# Patient Record
Sex: FEMALE | Race: WHITE | NOT HISPANIC OR LATINO | Employment: FULL TIME | URBAN - METROPOLITAN AREA
[De-identification: names, ages, dates, MRNs, and addresses within clinical notes are randomized per-mention and may not be internally consistent; named-entity substitution may affect disease eponyms.]

---

## 2017-01-12 ENCOUNTER — OFFICE VISIT (OUTPATIENT)
Dept: SURGERY | Facility: CLINIC | Age: 42
End: 2017-01-12

## 2017-01-12 VITALS
OXYGEN SATURATION: 99 % | TEMPERATURE: 97.5 F | SYSTOLIC BLOOD PRESSURE: 124 MMHG | WEIGHT: 149.6 LBS | BODY MASS INDEX: 27.53 KG/M2 | HEART RATE: 61 BPM | HEIGHT: 62 IN | DIASTOLIC BLOOD PRESSURE: 82 MMHG

## 2017-01-12 DIAGNOSIS — Z15.02 GENETIC SUSCEPTIBILITY TO OVARIAN CANCER: ICD-10-CM

## 2017-01-12 DIAGNOSIS — D48.5 NEOPLASM OF UNCERTAIN BEHAVIOR OF SKIN: Primary | ICD-10-CM

## 2017-01-12 DIAGNOSIS — Z80.3 FH: BREAST CANCER: ICD-10-CM

## 2017-01-12 PROCEDURE — 99214 OFFICE O/P EST MOD 30 MIN: CPT | Performed by: SURGERY

## 2017-01-12 PROCEDURE — 88305 TISSUE EXAM BY PATHOLOGIST: CPT | Performed by: SURGERY

## 2017-01-12 PROCEDURE — 11100 PR BIOPSY OF SKIN LESION: CPT | Performed by: SURGERY

## 2017-01-12 RX ORDER — CELECOXIB 200 MG/1
200 CAPSULE ORAL DAILY
COMMUNITY
End: 2019-01-11

## 2017-01-12 RX ORDER — SERTRALINE HYDROCHLORIDE 100 MG/1
100 TABLET, FILM COATED ORAL DAILY
COMMUNITY
End: 2021-07-12

## 2017-01-12 RX ORDER — TAMOXIFEN CITRATE 20 MG/1
TABLET ORAL DAILY
COMMUNITY
End: 2019-01-11

## 2017-01-12 RX ORDER — FOLIC ACID 1 MG/1
1 TABLET ORAL DAILY
COMMUNITY

## 2017-01-12 NOTE — PROGRESS NOTES
Chief Complaint: Yanni Arauz is a 41 y.o. female who was seen in consultation at the request of Chris Fajardo,*  for a followup visit    History of Present Illness:  Patient presents with a newly diagnosed JAMES mutation.   Her mother had breast cancer diagnosed timothy ge 49, so Dr Fajardo sent a My Risk panel, which returend as positive for an JAMES mutation.      She noted no masses, skin changes, nipple discharge, nipple changes prior to her most recent imaging.   Her most recent imaging includes:  10/29/12        [facility]         Mammo Screening Bilateral            Yanni Arauz.  The breasts are heterogenously dense.  Ill-defined subcentimeter opacities are seen in the mid medial left breast on the CC view, and in the mid inferior right breast on the MLO view.  IMPRESSION:  Birads 0.    11/13/12        [facility]         Mammo Diagnostic Bilateral            aYnni Arauz.  The previously noted asymmetries int he medial left breast and inferior right breast do not persist on the additional views.   Birads 2.     10/30/13       [facility]         Mammo Screening Bilateral w/CAD           Yanni Arauz.  Birads 2.    11/20/14        [facility]         Digital Screening          Yanni Arauz.  Birads 2 Benign.  Breast Composition: Heterogeneously dense.      We then arranged for her to have a breast MRI, as follows:    08/11/15      [facility]         Breast Bilateral MRI          Yanni Arauz.  Birads 1 Negative.    She has not had  a breast biopsy in the past.  She has her uterus and ovaries, is premenopausal, and takes no hormones.  Her family history includes the following: Shehas one daughter, one son, one sister, 2 MA, 1 MU, 4 PA, 3 PU. Her mother had breast cancer diagnosed at age 49, alive in 2015.  No other breast and no ovarian cancer in her family.  She is here for evaluation.     Interval History:  In the interim,  Yanni Arauz  has done well.  She has noted no changes in her breast exam. No new masses, skin changes,  nipple changes, nipple discharge either breast.   She denies headache, bone pain, belly pain, cough, changes in vision or gait.  Her most recent imaging includes the following:  [facility] November 29, 2016 screening mammogram.  Heterogenous a dense.  BI-RADS 1 screening MRI, no  evidence of malignancy.    Review of Systems:  Review of Systems   Constitutional: Positive for unexpected weight change (4.6 lb weight increase).   All other systems reviewed and are negative.       Past Medical and Surgical History:  Breast Biopsy History:  Patient has not had a breast biopsy in the past.  Breast Cancer HIstory:  Patient does not have a past medical history of breast cancer.  Breast Operations, and year:  None   Obstetric/Gynecologic History:  Age menstrual periods began: 13  Patient is premenopausal, first day of last period: 11/8-13th  Number of pregnancies: 2  Number of live births: 2  Number of abortions or miscarriages: 0  Age of delivery of first child: 28  Patient did not breast feed.  Length of time taking birth control pills: since 17 on and off  Patient has never taken hormone replacement    Past Surgical History   Procedure Laterality Date   • Right oophorectomy       DUE TO ENDOMETRIOSIS   • Wrist fracture surgery     • Knee arthroplasty       Past Medical History   Diagnosis Date   • Anxiety    • Rheumatoid arthritis      JEUVENIRAJESH, AGE 6       Prior Hospitalizations, other than for surgery or childbirth, and year:  None     Social History     Social History   • Marital status:      Spouse name: N/A   • Number of children: N/A   • Years of education: N/A     Occupational History   • Not on file.     Social History Main Topics   • Smoking status: Never Smoker   • Smokeless tobacco: Not on file   • Alcohol use 1.8 oz/week     3 Glasses of wine per week   • Drug use: No   • Sexual activity: Not on file     Other Topics Concern   • Not on file     Social History Narrative     Patient is  ".  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    Family History:  Family History   Problem Relation Age of Onset   • Hypertension Mother    • Hyperlipidemia Mother    • Breast cancer Mother 49   • Hypertension Father    • Hyperlipidemia Father    • Heart failure Father    • Cancer Father 71     COLORECTAL       Vital Signs:  Visit Vitals   • /82 (BP Location: Left arm, Patient Position: Sitting, Cuff Size: Adult)   • Pulse 61   • Temp 97.5 °F (36.4 °C) (Temporal Artery )   • Ht 62\" (157.5 cm)   • Wt 149 lb 9.6 oz (67.9 kg)   • SpO2 99%   • BMI 27.36 kg/m2        Medications:    Current Outpatient Prescriptions:   •  celecoxib (CeleBREX) 200 MG capsule, Take 200 mg by mouth Daily., Disp: , Rfl:   •  folic acid (FOLVITE) 1 MG tablet, Take 1 mg by mouth Daily., Disp: , Rfl:   •  Golimumab (SIMPONI SC), Inject  under the skin., Disp: , Rfl:   •  methotrexate (RHEUMATREX) 2.5 MG tablet, Take 2.5 mg by mouth 3 (Three) Doses Each Week. Take Doses 12 (Twelve) Hours Apart., Disp: , Rfl:   •  sertraline (ZOLOFT) 100 MG tablet, Take 100 mg by mouth Daily., Disp: , Rfl:   •  tamoxifen (NOLVADEX) 20 MG chemo tablet, Take  by mouth Daily., Disp: , Rfl:      Allergies:  No Known Allergies    Physical Examination:  Visit Vitals   • /82 (BP Location: Left arm, Patient Position: Sitting, Cuff Size: Adult)   • Pulse 61   • Temp 97.5 °F (36.4 °C) (Temporal Artery )   • Ht 62\" (157.5 cm)   • Wt 149 lb 9.6 oz (67.9 kg)   • SpO2 99%   • BMI 27.36 kg/m2     General Appearance:  Patient is in no distress.  She is well kept and has a  average  build.   Psychiatric:  Patient with appropriate mood and affect. Alert and oriented to self, time, and place.    Breast, RIGHT:  small sized, symmetric with the contralateral side.  Breast skin is without erythema, edema, rashes.  There are no visible abnormalities upon " inspection during the arm-raising maneuver or with hands on hips in the sitting position. There is no nipple retraction, discharge or nipple/areolar skin changes.There are no masses palpable in the sitting or supine positions.    Breast, LEFT:   small sized, symmetric with the contralateral side.  Breast skin is without erythema, edema, rashes.  There is a skin lesion that is hyperpigmented and with some irregularity to the margins and color heterogeneity LEFT breast UOQ, inner ring. 5mm. It is very slightly raised.  There are no visible abnormalities upon inspection during the arm-raising maneuver or with hands on hips in the sitting position. There is no nipple retraction, discharge or nipple/areolar skin changes.There are no masses palpable in the sitting or supine positions.    Lymphatic:  There is no axillary, cervical, infraclavicular, or supraclavicular adenopathy bilaterally.  Eyes:  Pupils are round and reactive to light.  Cardiovascular:  Heart rate and rhythm are regular.  Respiratory:  Lungs are clear bilaterally with no crackles or wheezes in any lung field.  Gastrointestinal:  Abdomen is soft, nondistended, and nontender.  Musculoskeletal:  Good strength in all 4 extremities.  There is good range of motion in both shoulders.   Skin:  No new skin lesions or rashes on the skin excluding the breast (see breast exam above).        Imagin14        [facility]         Digital Screening          Juan Luis Arauz  Birads 2 Benign.  Breast Composition: Heterogeneously dense.    08/11/15      [facility]         Breast Bilateral MRI          Juan Luis Arauz  Birads 1 Negative.    16                        [facility]                            BILATERAL MRI BREAST                          JUAN LUIS ARAUZ  IMPRESSION:  NO MR signs of malignancy in either breast.  BI-RADS 1 NEGATIVE    16                                  [facility]                         SCREENING MG BILATERAL               JUAN LUIS ARAUZ  BIRADS 1  NEGATIVE  The breasts are heterogeneously dense.                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   Seeing her back in approximately 6 months.       7-28-16                         [facility]                           JUAN LUIS CROUCH MD     PLAN:  She is tolerating tamoxifen well with no adverse events or complications. I will plan on seeing her back in approximately 6 months.        Procedures:  Punch biopsy skin/areolar/nipple lesion:  Indication:  skin lesion of indeterminate significance  Location: LEFT breast UOQ,  inner ring  Consent:  The risks, benefits, and alternatives to the procedure were discussed with the patient, who understood and wished to proceed.  The risks described included, but were not limited to, bleeding and infection.  Description of Procedure:   After the patient was positioned supine on the procedure table, I prepped and draped the affected breast skin in sterile fashion.  I anesthetized the affected skin and subcutaneous tissue with 1% lidocaine with epinephrine.  I then I then took a 8mm punch biopsy of the affected skin.   Manual compression was held for 5 minutes, 3x 4-0 nylon sutures were placed to approximate the skin edges. Antibacterial ointment  and a sterile dressing were applied.  Tolerance: The patient tolerated the procedure well.  Disposition: We will see her back within a week to discuss her pathology.    Assessment:   Diagnosis Plan   1. Neoplasm of uncertain behavior of skin  MRI Breast Bilateral With & Without Contrast    Mammo Screening Bilateral With CAD   2. Genetic susceptibility to ovarian cancer  MRI Breast Bilateral With & Without Contrast     Mammo Screening Bilateral With CAD   3. FH: breast cancer  MRI Breast Bilateral With & Without Contrast    Mammo Screening Bilateral With CAD   1-  LEFT breast UOQ, inner ring, 5mm hyperpigmented lesion with some border irregularity and heterogeneity of color   - removed with punch 1-11-17    2-  JAMES: c.7271T>G- diagnosed via MyRisk from Dr Tana Erwin Tamoxifen since 1-2016    3-  mother age 49        Plan:  Yanni is doing well today.  We reviewed her interval imaging records.  Her examination is in good order other than a hyperpigmented lesion on the left breast skin and I recommended that we remove.  It appears about a millimeter larger and it has some color heterogeneity and border irregularity.  She agreed and tolerated this procedure well.      We previously discussed that the JAMES mutation that she carries harbors a lifetime risk of 17-52%, but likely closer to the 52% end ofthe spectrum due to the exact mutation.  We discussed that Iwould not necessarily recommend surgical risk reduction for this level of risk.  We did discuss proceeding with MRI in addition to mammography annualy for her surveillance with annual CBE.  She has seen Dr. Erwin and his been taking tamoxifen since January 2016.    I will arrange for her next mammogram and MRI from November 30, 2017 at [facility].    We discussed her weight gain is likely related to taking the tamoxifen.    I will call her with her pathology reports on her biopsy.    I asked her to continue her monthly SBE in addition to her annual CBE and exam, and to call us in the interim with concerns or changes.      Naida Mosqueda MD        We have spent 25 minutes in visit today, 15 in face to face .      Next Appointment:  Return in about 2 weeks (around 1/26/2017) for pt may call to cancel if has sutures removed at home.      EMR Dragon/transcription disclaimer:    Much of this encounter note is an electronic transcription/translocation of spoken language  to printed text.  The electronic translation of spoken language may permit erroneous, or at times, nonsensical words or phrases to be inadvertently transcribed.  Although I have reviewed the note from such areas, some may still exist.

## 2017-01-12 NOTE — MR AVS SNAPSHOT
Yanni Arauz   2017 11:30 AM   Office Visit    Dept Phone:  [phone]   Encounter #:  [id number]    Provider:  Naida Mosqueda MD   Department:  [facility] BREAST SURGERY                Your Full Care Plan              Your Updated Medication List          This list is accurate as of: 17 11:59 PM.  Always use your most recent med list.                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 BREAST CLINIC [facility]      AKT Signup     [facility] AKT allows you to send messages to your doctor, view your test results, renew your prescriptions, schedule appointments, and more. To sign up, go to [email or web address] and click on the Sign Up Now link in the New User? box. Enter your AKT Activation Code exactly as it appears below along with the last four digits of your Social Security Number and your Date of Birth () to complete the sign-up process. If you do not sign up before the expiration date, you must request a new code.    AKT Activation Code: [email or web address]  Expires: 2017  9:34 AM    If you have questions, you can email [email or web address] or  "call [phone] to talk to our MyChart staff. Remember, [employer] is NOT to be used for urgent needs. For medical emergencies, dial 911.               Other Info from Your Visit           Your Appointments     Jan 26, 2017  9:00 AM EST   Nurse Visit with NURSE/MA BREAST CLINIC [facility] BREAST SURGERY (--)    Ewa Turner [facility] [id number] [phone]              Allergies     No Known Allergies      Reason for Visit     Follow-up           Vital Signs     Blood Pressure Pulse Temperature Height    124/82 (BP Location: Left arm, Patient Position: Sitting, Cuff Size: Adult) 61 97.5 °F (36.4 °C) (Temporal Artery ) 62\" (157.5 cm)    Weight Oxygen Saturation Body Mass Index Smoking Status    149 lb 9.6 oz (67.9 kg) 99% 27.36 kg/m2 Never Smoker      Problems and Diagnoses Noted     FH: breast cancer    Genetic susceptibility to ovarian cancer    Skin tumor      Results         "

## 2017-01-13 LAB
CYTO UR: NORMAL
LAB AP CASE REPORT: NORMAL
Lab: NORMAL
PATH REPORT.FINAL DX SPEC: NORMAL
PATH REPORT.GROSS SPEC: NORMAL

## 2017-01-16 ENCOUNTER — TELEPHONE (OUTPATIENT)
Dept: SURGERY | Facility: CLINIC | Age: 42
End: 2017-01-16

## 2017-01-16 NOTE — TELEPHONE ENCOUNTER
Pathology from punch biopsy returned as nevus with mild cytologic atypia.    i called to let her know.      Final Diagnosis   LEFT BREAST BIOPSY (SKIN):  SKIN WITH COMPOUND NEVUS WITH MILD CYTOLOGIC ATYPIA.   MARGIN FREE.      [facility]/a/THM  [facility]/juan pablo

## 2017-01-26 ENCOUNTER — TELEPHONE (OUTPATIENT)
Dept: SURGERY | Facility: CLINIC | Age: 42
End: 2017-01-26

## 2017-01-26 ENCOUNTER — CLINICAL SUPPORT (OUTPATIENT)
Dept: SURGERY | Facility: CLINIC | Age: 42
End: 2017-01-26

## 2017-01-26 NOTE — TELEPHONE ENCOUNTER
Pt requested Dr. Mosqueda's recommendations for Dermatology  Beba Mack and Cosme Mejía    I shared with patient. LML

## 2017-01-26 NOTE — MR AVS SNAPSHOT
Yanni Giraldosley   2017 9:00 AM   Clinical Support    Dept Phone:  [phone]   Encounter #:  [id number]    Provider:  NURSE/MA BREAST CLINIC [facility]   Department:  [facility] BREAST SURGERY                Your Full Care Plan              Your Updated Medication List          This list is accurate as of: 17 11:11 AM.  Always use your most recent med list.                celecoxib 200 MG capsule   Commonly known as:  CeleBREX       folic acid 1 MG tablet   Commonly known as:  FOLVITE       methotrexate 2.5 MG tablet   Commonly known as:  RHEUMATREX       sertraline 100 MG tablet   Commonly known as:  ZOLOFT       SIMPONI SC       tamoxifen 20 MG chemo tablet   Commonly known as:  NOLVADEX               Instructions     None    Patient Instructions History      Upcoming Appointments     Visit Type Date Time Department    NURSE/MA VISIT 2017  9:00 AM [facility] BREAST CLINIC [facility]      [facility] [employer] Signup     [facility] [employer] allows you to send messages to your doctor, view your test results, renew your prescriptions, schedule appointments, and more. To sign up, go to [email or web address] and click on the Sign Up Now link in the New User? box. Enter your [employer] Activation Code exactly as it appears below along with the last four digits of your Social Security Number and your Date of Birth () to complete the sign-up process. If you do not sign up before the expiration date, you must request a new code.    [employer] Activation Code: [email or web address]  Expires: 2017  9:34 AM    If you have questions, you can email [email or web address] or call [phone] to talk to our [employer] staff. Remember, [employer] is NOT to be used for urgent needs. For medical emergencies, dial 911.               Other Info from Your Visit           Allergies     No Known Allergies      Vital Signs     Smoking Status                   Never Smoker

## 2017-01-26 NOTE — PROGRESS NOTES
Patient came in today for Left breast punch biopsy suture removal. Removed 3 sutures. Punch biopsy site healing, clean, dry & intact.     LML

## 2017-07-28 ENCOUNTER — TELEPHONE (OUTPATIENT)
Dept: SURGERY | Facility: CLINIC | Age: 42
End: 2017-07-28

## 2017-07-28 NOTE — TELEPHONE ENCOUNTER
Left msg to inform pt of appts:  [facility] 12-5-17 7:30 arrival Mammo/MRI  Dr. Mosqueda 12-14-17 12:45 arrival  Asked pt to call to confirm insurance info

## 2017-08-30 ENCOUNTER — TELEPHONE (OUTPATIENT)
Dept: SURGERY | Facility: CLINIC | Age: 42
End: 2017-08-30

## 2017-08-30 NOTE — TELEPHONE ENCOUNTER
Medical oncology note dated August 29, 2017 Dr. Everardo Pappas.  Agree with annual MRI and mammogram.  At this time she would like to not pursue additional prophylactic antiestrogen.  There is no information available at this time demonstrating the efficacy of concerns or aromatase inhibitors with this specific mutation.  With a long-term strategy of risk reduction, I suggested we consider tamoxifen again in the future.  Also, at the time of menopause 12 aromatase inhibitors would be appropriate.  Follow-up with me as needed.  We agree on a routine visit in 1-2 years.

## 2017-11-27 ENCOUNTER — TELEPHONE (OUTPATIENT)
Dept: SURGERY | Facility: CLINIC | Age: 42
End: 2017-11-27

## 2017-12-07 ENCOUNTER — CONVERSION ENCOUNTER (OUTPATIENT)
Dept: MAMMOGRAPHY | Facility: HOSPITAL | Age: 42
End: 2017-12-07

## 2017-12-11 ENCOUNTER — TELEPHONE (OUTPATIENT)
Dept: SURGERY | Facility: CLINIC | Age: 42
End: 2017-12-11

## 2017-12-11 DIAGNOSIS — D48.5 NEOPLASM OF UNCERTAIN BEHAVIOR OF SKIN: ICD-10-CM

## 2017-12-11 DIAGNOSIS — Z80.3 FH: BREAST CANCER: ICD-10-CM

## 2017-12-11 DIAGNOSIS — Z15.02 GENETIC SUSCEPTIBILITY TO OVARIAN CANCER: ICD-10-CM

## 2017-12-11 NOTE — TELEPHONE ENCOUNTER
[facility] December 7, 2017 bilateral screening mammogram with 3-D.  Heterogenous leg dense breast tissue.  BI-RADS 1.

## 2017-12-13 ENCOUNTER — TELEPHONE (OUTPATIENT)
Dept: SURGERY | Facility: CLINIC | Age: 42
End: 2017-12-13

## 2017-12-13 DIAGNOSIS — R92.8 ABNORMAL MRI, BREAST: Primary | ICD-10-CM

## 2017-12-13 NOTE — TELEPHONE ENCOUNTER
[facility] December 7, 2017 bilateral breast MRI:  There is a 5 mm focus of suspicious enhancement in the lower outer left breast approximate 4 cm from the nipple axillary lymph nodes are normal in appearance.  Physician directed ultrasound could be performed chromic recommend either ultrasound guided or MRI directed biopsy. We will ararnge for an US, US biopsy or MRI guided biopsy.

## 2017-12-13 NOTE — TELEPHONE ENCOUNTER
I called to let pt know that there is an imagnig finding for which either an US or MRI guided biopsy has been recommended and we will arrange and see her back.     This is based on 12/7/17 Breast MRI from [facility]  Radiologist is recommending biopsy-either via U/S or MRI guided.     I had to leave message for patient to call me back.     lml

## 2017-12-14 ENCOUNTER — TELEPHONE (OUTPATIENT)
Dept: SURGERY | Facility: CLINIC | Age: 42
End: 2017-12-14

## 2017-12-14 NOTE — TELEPHONE ENCOUNTER
Yanni called and confirmed her U/s appt for [facility] 12-21-17  I explained she will be having a Bx once they let us know which kind she needs.  She v/u  kdl

## 2017-12-14 NOTE — TELEPHONE ENCOUNTER
HERNAN for pt to call:    Melia called from [facility] she spoke with Dr. Diaz.  Dr. Maurice is going to do:    Left Breast U/S 12-21-17 arrive 10:00 @ [facility]  She will decided after the U/s if she is going to do  A U/s guided bx or MRI guided bx.    The tech from [facility] will call us and let us know.    susan

## 2017-12-22 ENCOUNTER — TELEPHONE (OUTPATIENT)
Dept: SURGERY | Facility: CLINIC | Age: 42
End: 2017-12-22

## 2017-12-22 DIAGNOSIS — R92.8 ABNORMAL MRI, BREAST: Primary | ICD-10-CM

## 2017-12-22 NOTE — TELEPHONE ENCOUNTER
Left breast ultrasound: In the 4 o'clock position of the left breast there is a focus of duct ectasia measuring 5 mm.  In the 3:00 location there is a 5.6 mm probable internal mammary lymph node.  Conclusion the duct ectasia has a benign appearance.  It is possible that the benign appearing intramammary lymph node could represent a suspicious focus of enhancement on the recent MRI.  So would recommend MRI guided biopsy: BI-RADS IVb.  We will arrange for an MRI guided biopsy at [facility] prior to seeing me back.

## 2017-12-26 ENCOUNTER — TELEPHONE (OUTPATIENT)
Dept: SURGERY | Facility: CLINIC | Age: 42
End: 2017-12-26

## 2017-12-26 NOTE — TELEPHONE ENCOUNTER
Scheduled MRi Breast Bx Left @ [facility]  1-2-18 arrive 12:00    Pt is aware of appointment-  Also patient is aware if results are not back, her appointment with Dr Mosqueda  Will be changed.    Pt v/u    elisabethl

## 2018-01-02 ENCOUNTER — CONVERSION ENCOUNTER (OUTPATIENT)
Dept: MRI IMAGING | Facility: HOSPITAL | Age: 43
End: 2018-01-02

## 2018-01-02 DIAGNOSIS — R92.8 ABNORMAL MRI, BREAST: ICD-10-CM

## 2018-01-05 ENCOUNTER — TELEPHONE (OUTPATIENT)
Dept: SURGERY | Facility: CLINIC | Age: 43
End: 2018-01-05

## 2018-01-05 NOTE — TELEPHONE ENCOUNTER
[facility] note dated January 2, 2018 MRI guided breast biopsy: 10-gauge Encore multiple core biopsies.  M-shaped marker placed.  Addendum will be provided when the pathology is available.

## 2018-01-08 ENCOUNTER — OFFICE VISIT (OUTPATIENT)
Dept: SURGERY | Facility: CLINIC | Age: 43
End: 2018-01-08

## 2018-01-08 VITALS
SYSTOLIC BLOOD PRESSURE: 156 MMHG | OXYGEN SATURATION: 99 % | HEART RATE: 76 BPM | WEIGHT: 151.2 LBS | HEIGHT: 62 IN | BODY MASS INDEX: 27.82 KG/M2 | DIASTOLIC BLOOD PRESSURE: 89 MMHG

## 2018-01-08 DIAGNOSIS — N60.12 FIBROCYSTIC DISEASE OF LEFT BREAST: ICD-10-CM

## 2018-01-08 DIAGNOSIS — Z15.02 GENETIC SUSCEPTIBILITY TO OVARIAN CANCER: ICD-10-CM

## 2018-01-08 DIAGNOSIS — Z80.3 FH: BREAST CANCER: ICD-10-CM

## 2018-01-08 DIAGNOSIS — D22.9 ATYPICAL NEVUS: Primary | ICD-10-CM

## 2018-01-08 DIAGNOSIS — R92.8 ABNORMAL MRI, BREAST: ICD-10-CM

## 2018-01-08 PROCEDURE — 99213 OFFICE O/P EST LOW 20 MIN: CPT | Performed by: SURGERY

## 2018-01-08 NOTE — PROGRESS NOTES
Chief Complaint: Yanni Arauz is a 42 y.o. female who was seen in consultation at the request of Chris Fajardo,*  for Neoplasm of uncertain behavior of skin  and a followup visit    History of Present Illness:  Patient presents with a newly diagnosed JAMES mutation.   Her mother had breast cancer diagnosed timothy ge 49, so Dr Fajardo sent a My Risk panel, which returend as positive for an JAMES mutation.      She noted no masses, skin changes, nipple discharge, nipple changes prior to her most recent imaging.   Her most recent imaging includes:  10/29/12        [facility]         Mammo Screening Bilateral            Yanni Arauz.  The breasts are heterogenously dense.  Ill-defined subcentimeter opacities are seen in the mid medial left breast on the CC view, and in the mid inferior right breast on the MLO view.  IMPRESSION:  Birads 0.    11/13/12        [facility]         Mammo Diagnostic Bilateral            Yanni Arauz.  The previously noted asymmetries int he medial left breast and inferior right breast do not persist on the additional views.   Birads 2.     10/30/13       [facility]         Mammo Screening Bilateral w/CAD           Yanni Arauz.  Birads 2.    11/20/14        [facility]         Digital Screening          Yanni Arauz.  Birads 2 Benign.  Breast Composition: Heterogeneously dense.      We then arranged for her to have a breast MRI, as follows:    08/11/15      [facility]         Breast Bilateral MRI          Yanni Arauz.  Birads 1 Negative.    She has not had  a breast biopsy in the past.  She has her uterus and ovaries, is premenopausal, and takes no hormones.  Her family history includes the following: Shehas one daughter, one son, one sister, 2 MA, 1 MU, 4 PA, 3 PU. Her mother had breast cancer diagnosed at age 49, alive in 2015.  No other breast and no ovarian cancer in her family.  She is here for evaluation.       In the interim,  Yanni Arauz  has done well.  She has noted no changes in her breast exam. No  new masses, skin changes, nipple changes, nipple discharge either breast.   She denies headache, bone pain, belly pain, cough, changes in vision or gait.  Her most recent imaging includes the following:  [facility] November 29, 2016 screening mammogram.  Heterogenous a dense.  BI-RADS 1 screening MRI, no  evidence of malignancy.      Interval History:  In the interim,  Yanni Arauz  has done well.  Her punch biopsy returned as a nevus with mild cytologic atypia.  She has seen Dr. Vaca her dermatology in follow-up about this.  Medical oncology note dated August 29, 2017 Dr. Everardo Pappas.  Agree with annual MRI and mammogram.  At this time she would like to not pursue additional prophylactic antiestrogen.  There is no information available at this time demonstrating the efficacy of concerns or aromatase inhibitors with this specific mutation.  With a long-term strategy of risk reduction, I suggested we consider tamoxifen again in the future.  Also, at the time of menopause 12 aromatase inhibitors would be appropriate.  Follow-up with me as needed.  We agree on a routine visit in 1-2 years.    She has noted no changes in her breast exam. No new masses, skin changes, nipple changes, nipple discharge either breast.   She denies headache, bone pain, belly pain, cough, changes in vision or gait.  Her most recent imaging includes the following:  [facility] December 7, 2017 bilateral screening mammogram with 3-D.  Heterogenous leg dense breast tissue.  BI-RADS 1.  [facility] December 7, 2017 bilateral breast MRI:  There is a 5 mm focus of suspicious enhancement in the lower outer left breast approximate 4 cm from the nipple axillary lymph nodes are normal in appearance.  Physician directed ultrasound could be performed chromic recommend either ultrasound guided or MRI directed biopsy.    Left breast ultrasound: In the 4 o'clock position of the left breast there is a focus of duct ectasia  measuring 5 mm.  In the 3:00 location there is a 5.6 mm probable internal mammary lymph node.  Conclusion the duct ectasia has a benign appearance.  It is possible that the benign appearing intramammary lymph node could represent a suspicious focus of enhancement on the recent MRI.  So would recommend MRI guided biopsy: BI-RADS IVb.  [facility] note dated January 2, 2018 MRI guided breast biopsy: 10-gauge Encore multiple core biopsies.  M-shaped marker placed.  Pathology returned as benign breast tissue with columnar cell change, apically metaplasia, and mild stromal fibrosis.  She tells me that she had some bruising and some drainage of the hematoma from her mammotomy.  Denies any erythema or warmth.      Review of Systems:  Review of Systems   Constitutional: Positive for unexpected weight change (2 lb wt gain).   All other systems reviewed and are negative.       Past Medical and Surgical History:  Breast Biopsy History:  Patient has not had a breast biopsy in the past.  Breast Cancer HIstory:  Patient does not have a past medical history of breast cancer.  Breast Operations, and year:  None   Obstetric/Gynecologic History:  Age menstrual periods began: 13  Patient is premenopausal, first day of last period: 11/8-13th  Number of pregnancies: 2  Number of live births: 2  Number of abortions or miscarriages: 0  Age of delivery of first child: 28  Patient did not breast feed.  Length of time taking birth control pills: since 17 on and off  Patient has never taken hormone replacement    Past Surgical History:   Procedure Laterality Date   • KNEE ARTHROPLASTY     • RIGHT OOPHORECTOMY      DUE TO ENDOMETRIOSIS   • WRIST FRACTURE SURGERY       Past Medical History:   Diagnosis Date   • Anxiety    • Rheumatoid arthritis     TAMIKO, AGE 6       Prior Hospitalizations, other than for surgery or childbirth, and year:  None     Social History     Social History   • Marital status:      Spouse name: N/A  "  • Number of children: N/A   • Years of education: N/A     Occupational History   • Not on file.     Social History Main Topics   • Smoking status: Never Smoker   • Smokeless tobacco: Not on file   • Alcohol use 1.8 oz/week     3 Glasses of wine per week   • Drug use: No   • Sexual activity: Not on file     Other Topics Concern   • Not on file     Social History Narrative     Patient is .  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    Family History:  Family History   Problem Relation Age of Onset   • Hypertension Mother    • Hyperlipidemia Mother    • Breast cancer Mother 49   • Hypertension Father    • Hyperlipidemia Father    • Heart failure Father    • Cancer Father 71     COLORECTAL       Vital Signs:  /89  Pulse 76  Ht 157.5 cm (62\")  Wt 68.6 kg (151 lb 3.2 oz)  SpO2 99%  BMI 27.65 kg/m2     Medications:    Current Outpatient Prescriptions:   •  celecoxib (CeleBREX) 200 MG capsule, Take 200 mg by mouth Daily., Disp: , Rfl:   •  folic acid (FOLVITE) 1 MG tablet, Take 1 mg by mouth Daily., Disp: , Rfl:   •  Golimumab (SIMPONI SC), Inject  under the skin., Disp: , Rfl:   •  methotrexate (RHEUMATREX) 2.5 MG tablet, Take 2.5 mg by mouth 3 (Three) Doses Each Week. Take Doses 12 (Twelve) Hours Apart., Disp: , Rfl:   •  sertraline (ZOLOFT) 100 MG tablet, Take 100 mg by mouth Daily., Disp: , Rfl:   •  tamoxifen (NOLVADEX) 20 MG chemo tablet, Take  by mouth Daily., Disp: , Rfl:      Allergies:  No Known Allergies    Physical Examination:  /89  Pulse 76  Ht 157.5 cm (62\")  Wt 68.6 kg (151 lb 3.2 oz)  SpO2 99%  BMI 27.65 kg/m2  General Appearance:  Patient is in no distress.  She is well kept and has a  average  build.   Psychiatric:  Patient with appropriate mood and affect. Alert and oriented to self, time, and place.    Breast, RIGHT:  small sized, symmetric with the contralateral " side.  Breast skin is without erythema, edema, rashes.  There are no visible abnormalities upon inspection during the arm-raising maneuver or with hands on hips in the sitting position. There is no nipple retraction, discharge or nipple/areolar skin changes.There are no masses palpable in the sitting or supine positions.    Breast, LEFT:   small sized, symmetric with the contralateral side.  Breast skin is without erythema, edema, rashes.  There is a hypertrophic transverse incision from where we did a punch biopsy left breast upper outer quadrant revealed atypical nevus There are no visible abnormalities upon inspection during the arm-raising maneuver or with hands on hips in the sitting position. There is no nipple retraction, discharge or nipple/areolar skin changes.There are no masses palpable in the sitting or supine positions.  Left breast lower outer quadrant mammotomy with underlying hematoma.  Skin has  and healing by secondary intention.    Lymphatic:  There is no axillary, cervical, infraclavicular, or supraclavicular adenopathy bilaterally.  Eyes:  Pupils are round and reactive to light.  Cardiovascular:  Heart rate and rhythm are regular.  Respiratory:  Lungs are clear bilaterally with no crackles or wheezes in any lung field.  Gastrointestinal:  Abdomen is soft, nondistended, and nontender.  Musculoskeletal:  Good strength in all 4 extremities.  There is good range of motion in both shoulders.   Skin:  No new skin lesions or rashes on the skin excluding the breast (see breast exam above).        Imagin14        [facility]         Digital Screening          Juan Luis Arauz  Birads 2 Benign.  Breast Composition: Heterogeneously dense.    08/11/15      [facility]         Breast Bilateral MRI          Juan Luis Arauz  Birads 1 Negative.    16                        [facility]                            BILATERAL MRI BREAST                          JUAN LUIS ARAUZ  IMPRESSION:  NO MR signs of malignancy  in either breast.  BI-RADS 1 NEGATIVE    11-29-16                                  [facility]                         SCREENING MG BILATERAL               JUAN LUIS HANEY  BIRADS 1 NEGATIVE  The breasts are heterogeneously dense.    [facility] December 7, 2017 bilateral screening mammogram with 3-D.  Heterogenous leg dense breast tissue.  BI-RADS 1.    [facility] December 7, 2017 bilateral breast MRI:  There is a 5 mm focus of suspicious enhancement in the lower outer left breast approximate 4 cm from the nipple axillary lymph nodes are normal in appearance.  Physician directed ultrasound could be performed chromic recommend either ultrasound guided or MRI directed biopsy. We will ararnge for an US, US biopsy or MRI guided biopsy.    Left breast ultrasound: In the 4 o'clock position of the left breast there is a focus of duct ectasia measuring 5 mm.  In the 3:00 location there is a 5.6 mm probable internal mammary lymph node.  Conclusion the duct ectasia has a benign appearance.  It is possible that the benign appearing intramammary lymph node could represent a suspicious focus of enhancement on the recent MRI.  So would recommend MRI guided biopsy: BI-RADS IVb.  We will arrange for an MRI guided biopsy at [facility] prior to seeing me back    Pathology:  Pathology from punch biopsy returned as nevus with mild cytologic atypia.  i called to let her know.   1-16-17  [facility]  PATHOLOGY  JUAN LUIS HANEY  Final Diagnosis   LEFT BREAST BIOPSY (SKIN):  SKIN WITH COMPOUND NEVUS WITH MILD CYTOLOGIC ATYPIA.   MARGIN FREE.       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   Seeing her back in approximately 6 months.        7-28-16                         [facility] MED ONC                           JUAN LUIS CROUCH MD     PLAN:  She is tolerating tamoxifen well with no adverse events or complications. I will plan on seeing her back in approximately 6 months.    Medical oncology note dated August 29, 2017 Dr. Everardo Pappas.  Agree with annual MRI and mammogram.  At this time she would like to not pursue additional prophylactic antiestrogen.  There is no information available at this time demonstrating the efficacy of concerns or aromatase inhibitors with this specific mutation.  With a long-term strategy of risk reduction, I suggested we consider tamoxifen again in the future.  Also, at the time of menopause 12 aromatase inhibitors would be appropriate.  Follow-up with me as needed.  We agree on a routine visit in 1-2 years.    [facility] note dated January 2, 2018 MRI guided breast biopsy: 10-gauge Encore multiple core biopsies.  M-shaped marker placed.  Addendum will be provided when the pathology is available.    Procedures:  Punch biopsy skin lesion 1-12-17:  Indication:  skin lesion of indeterminate significance  Location: LEFT breast UOQ,  inner ring  Consent:  The risks, benefits, and alternatives to the procedure were discussed with the patient, who understood and wished to proceed.  The risks described included, but were not limited to, bleeding and infection.  Description of Procedure:   After the patient was positioned supine on the procedure table, I prepped and draped the affected breast skin in sterile fashion.  I anesthetized the affected skin and subcutaneous tissue with 1% lidocaine with epinephrine.  I then I then took a 8mm punch biopsy of the affected skin.   Manual compression was held for 5 minutes, 3x 4-0 nylon sutures were placed to approximate the skin edges. Antibacterial ointment  and a sterile dressing were applied.  Tolerance: The patient tolerated the procedure  well.  Disposition: We will see her back within a week to discuss her pathology.    Assessment:   Diagnosis Plan   1. Atypical nevus  MRI Breast Bilateral With & Without Contrast    Mammo Screening Digital Tomosynthesis Bilateral With CAD   2. Genetic susceptibility to ovarian cancer  MRI Breast Bilateral With & Without Contrast    Mammo Screening Digital Tomosynthesis Bilateral With CAD   3. FH: breast cancer  MRI Breast Bilateral With & Without Contrast    Mammo Screening Digital Tomosynthesis Bilateral With CAD   4. Abnormal MRI, breast  MRI Breast Bilateral With & Without Contrast    Mammo Screening Digital Tomosynthesis Bilateral With CAD   5. Fibrocystic disease of left breast  MRI Breast Bilateral With & Without Contrast    Mammo Screening Digital Tomosynthesis Bilateral With CAD      1-  LEFT breast UOQ, inner ring, 5mm hyperpigmented lesion with some border irregularity and heterogeneity of color   - removed with punch 1-11-17    2-  JAMES: c.[id number]- diagnosed via MyRisk from Dr Tana Erwin Tamoxifen since 1-2016- saw Dr Pappas- stopped this 2017.    3-  mother age 49    4-5  MRI in December 2017 [facility] lower outer quadrant 5 mm area of enhancement, no ultrasound correlate.  MRI guided biopsy showed benign breast with columnar cell change apically and metaplasia and stromal fibrosis.      Plan:  Yanni is doing well today.  We reviewed her interval imaging records as well as pathology report and examination today.  Her examination is in good order other than the hematoma from the MRI guided biopsy left breast.      We previously discussed that the JAMES mutation that she carries harbors a lifetime risk of 17-52%, but likely closer to the 52% end ofthe spectrum due to the exact mutation.  We discussed that Iwould not necessarily recommend surgical risk reduction for this level of risk.    We did discuss proceeding with MRI in addition to mammography annualy for her surveillance with annual  CBE.  She has seen Dr. Erwin as well as Dr. Pappas.  Dr. Erwin initially started her on tamoxifen in January 2016, and she stopped this after seeing in talking with Dr. Pappas.  She has a follow-up with Dr. Pappas in 1-2 years per his note.    We reviewed her imaging and pathology report.  We discussed the sensitivity of MRI and the benign nature of her biopsy report. We will obtain a concordance report from [facility].    I will arrange for a bilateral screening mammogram and MRI for December 8, 2018 at [facility] and to see us back after.  She does have surveillance with her dermatologist for the atypical nevus for annual skin screening.  I did ask her to continue her self breast exam and to call us in the interim with any concerns or changes and we'd be happy to see her back sooner.           Naida Mosqueda MD    Addendum: Benign breast tissue with columnar cell change apically metaplasia and mild stromal fibrosis is concordant.  We will let her know.    We have spent 15 minutes in visit today, 10 in face to face .      Next Appointment:  Return for Next scheduled follow up, after imaging.      EMR Dragon/transcription disclaimer:    Much of this encounter note is an electronic transcription/translocation of spoken language to printed text.  The electronic translation of spoken language may permit erroneous, or at times, nonsensical words or phrases to be inadvertently transcribed.  Although I have reviewed the note from such areas, some may still exist.

## 2018-01-09 ENCOUNTER — TELEPHONE (OUTPATIENT)
Dept: SURGERY | Facility: CLINIC | Age: 43
End: 2018-01-09

## 2018-01-09 NOTE — TELEPHONE ENCOUNTER
I called to let her know radiologist feels pathology is concordant and benign. Patient thanked us. job

## 2018-07-06 ENCOUNTER — TELEPHONE (OUTPATIENT)
Dept: SURGERY | Facility: CLINIC | Age: 43
End: 2018-07-06

## 2018-07-06 NOTE — TELEPHONE ENCOUNTER
PT CALLED. HAD BX ON LFT BR 12/17 AND EVERYTHING WAS FINE BUT IS STILL HAVE A LOT OF PAIN IN LFT BR.    SCHEDULED 7.31.18 ARRIVE 8:45AM    RR

## 2018-07-31 ENCOUNTER — OFFICE VISIT (OUTPATIENT)
Dept: SURGERY | Facility: CLINIC | Age: 43
End: 2018-07-31

## 2018-07-31 VITALS
HEIGHT: 62 IN | SYSTOLIC BLOOD PRESSURE: 133 MMHG | BODY MASS INDEX: 28.34 KG/M2 | OXYGEN SATURATION: 99 % | WEIGHT: 154 LBS | HEART RATE: 61 BPM | DIASTOLIC BLOOD PRESSURE: 84 MMHG

## 2018-07-31 DIAGNOSIS — N64.4 BREAST PAIN: ICD-10-CM

## 2018-07-31 DIAGNOSIS — D22.9 ATYPICAL NEVUS: Primary | ICD-10-CM

## 2018-07-31 DIAGNOSIS — Z80.3 FH: BREAST CANCER: ICD-10-CM

## 2018-07-31 DIAGNOSIS — R92.8 ABNORMAL MRI, BREAST: ICD-10-CM

## 2018-07-31 DIAGNOSIS — N60.12 FIBROCYSTIC DISEASE OF LEFT BREAST: ICD-10-CM

## 2018-07-31 DIAGNOSIS — Z15.02 GENETIC SUSCEPTIBILITY TO OVARIAN CANCER: ICD-10-CM

## 2018-07-31 PROCEDURE — 99213 OFFICE O/P EST LOW 20 MIN: CPT | Performed by: SURGERY

## 2018-07-31 NOTE — PROGRESS NOTES
Chief Complaint: Yanni Arauz is a 43 y.o. female who was seen in consultation at the request of No ref. provider found  for Neoplasm of uncertain behavior of skin  and a followup visit    History of Present Illness:  Patient presents with a newly diagnosed JAMES mutation.   Her mother had breast cancer diagnosed timothy ge 49, so Dr Fajardo sent a My Risk panel, which returend as positive for an JAMES mutation.      She noted no masses, skin changes, nipple discharge, nipple changes prior to her most recent imaging.   Her most recent imaging includes:  10/29/12        [facility]         Mammo Screening Bilateral            Yanni Arauz.  The breasts are heterogenously dense.  Ill-defined subcentimeter opacities are seen in the mid medial left breast on the CC view, and in the mid inferior right breast on the MLO view.  IMPRESSION:  Birads 0.    11/13/12        [facility]         Mammo Diagnostic Bilateral            Yanni Arauz.  The previously noted asymmetries int he medial left breast and inferior right breast do not persist on the additional views.   Birads 2.     10/30/13       [facility]         Mammo Screening Bilateral w/CAD           Yanni Arauz.  Birads 2.    11/20/14        [facility]         Digital Screening          Yanni Arauz.  Birads 2 Benign.  Breast Composition: Heterogeneously dense.      We then arranged for her to have a breast MRI, as follows:    08/11/15      [facility]         Breast Bilateral MRI          Yanni Arauz.  Birads 1 Negative.    She has not had  a breast biopsy in the past.  She has her uterus and ovaries, is premenopausal, and takes no hormones.  Her family history includes the following: Shehas one daughter, one son, one sister, 2 MA, 1 MU, 4 PA, 3 PU. Her mother had breast cancer diagnosed at age 49, alive in 2015.  No other breast and no ovarian cancer in her family.  She is here for evaluation.       In the interim,  Yanni Arauz  has done well.  She has noted no changes in her breast exam. No  new masses, skin changes, nipple changes, nipple discharge either breast.   She denies headache, bone pain, belly pain, cough, changes in vision or gait.  Her most recent imaging includes the following:  [facility] November 29, 2016 screening mammogram.  Heterogenous a dense.  BI-RADS 1 screening MRI, no  evidence of malignancy.      In the interim,  Yanni Arazu  has done well.  Her punch biopsy returned as a nevus with mild cytologic atypia.  She has seen Dr. Vaca her dermatology in follow-up about this.  Medical oncology note dated August 29, 2017 Dr. Everardo Pappas.  Agree with annual MRI and mammogram.  At this time she would like to not pursue additional prophylactic antiestrogen.  There is no information available at this time demonstrating the efficacy of concerns or aromatase inhibitors with this specific mutation.  With a long-term strategy of risk reduction, I suggested we consider tamoxifen again in the future.  Also, at the time of menopause 12 aromatase inhibitors would be appropriate.  Follow-up with me as needed.  We agree on a routine visit in 1-2 years.    She has noted no changes in her breast exam. No new masses, skin changes, nipple changes, nipple discharge either breast.   She denies headache, bone pain, belly pain, cough, changes in vision or gait.  Her most recent imaging includes the following:  [facility] December 7, 2017 bilateral screening mammogram with 3-D.  Heterogenous leg dense breast tissue.  BI-RADS 1.  [facility] December 7, 2017 bilateral breast MRI:  There is a 5 mm focus of suspicious enhancement in the lower outer left breast approximate 4 cm from the nipple axillary lymph nodes are normal in appearance.  Physician directed ultrasound could be performed chromic recommend either ultrasound guided or MRI directed biopsy.    Left breast ultrasound: In the 4 o'clock position of the left breast there is a focus of duct ectasia measuring 5 mm.  In  the 3:00 location there is a 5.6 mm probable internal mammary lymph node.  Conclusion the duct ectasia has a benign appearance.  It is possible that the benign appearing intramammary lymph node could represent a suspicious focus of enhancement on the recent MRI.  So would recommend MRI guided biopsy: BI-RADS IVb.  [facility] note dated January 2, 2018 MRI guided breast biopsy: 10-gauge Encore multiple core biopsies.  M-shaped marker placed.  Pathology returned as benign breast tissue with columnar cell change, apically metaplasia, and mild stromal fibrosis.  She tells me that she had some bruising and some drainage of the hematoma from her mammotomy.  Denies any erythema or warmth.      Interval History:  In the interim,  Yanni Arauz  has done well. SHe called due to persistent pain at the LEFt MRI guided bipsy site done at [facility] 1-2-18.  She tells me that the discomfort is present and has been bothering her more lately and thought shed get it checked.   She did have a sizeable hematoma, with blood leaking from mammotomy at the time of her procedure.    She has not taken any medication for this.  She has noted no other changes in her breast exam. No new masses, skin changes, nipple changes, nipple discharge either breast.           Review of Systems:  Review of Systems   Constitutional: Positive for unexpected weight change (3 LB WT GAIN).   All other systems reviewed and are negative.       Past Medical and Surgical History:  Breast Biopsy History:  Patient has not had a breast biopsy in the past.  Breast Cancer HIstory:  Patient does not have a past medical history of breast cancer.  Breast Operations, and year:  None   Obstetric/Gynecologic History:  Age menstrual periods began: 13  Patient is premenopausal, first day of last period: 11/8-13th  Number of pregnancies: 2  Number of live births: 2  Number of abortions or miscarriages: 0  Age of delivery of first child: 28  Patient did not breast  "feed.  Length of time taking birth control pills: since 17 on and off  Patient has never taken hormone replacement    Past Surgical History:   Procedure Laterality Date   • KNEE ARTHROPLASTY     • RIGHT OOPHORECTOMY      DUE TO ENDOMETRIOSIS   • WRIST FRACTURE SURGERY       Past Medical History:   Diagnosis Date   • Anxiety    • Rheumatoid arthritis (CMS/HCC)     ESTHELAUVENIRAJESH, AGE 6       Prior Hospitalizations, other than for surgery or childbirth, and year:  None     Social History     Social History   • Marital status:      Spouse name: N/A   • Number of children: N/A   • Years of education: N/A     Occupational History   • Not on file.     Social History Main Topics   • Smoking status: Never Smoker   • Smokeless tobacco: Not on file   • Alcohol use 1.8 oz/week     3 Glasses of wine per week   • Drug use: No   • Sexual activity: Not on file     Other Topics Concern   • Not on file     Social History Narrative   • No narrative on file     Patient is .  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    Family History:  Family History   Problem Relation Age of Onset   • Hypertension Mother    • Hyperlipidemia Mother    • Breast cancer Mother 49   • Hypertension Father    • Hyperlipidemia Father    • Heart failure Father    • Cancer Father 71        COLORECTAL       Vital Signs:  /84   Pulse 61   Ht 157.5 cm (62\")   Wt 69.9 kg (154 lb)   SpO2 99%   BMI 28.17 kg/m²      Medications:    Current Outpatient Prescriptions:   •  celecoxib (CeleBREX) 200 MG capsule, Take 200 mg by mouth Daily., Disp: , Rfl:   •  folic acid (FOLVITE) 1 MG tablet, Take 1 mg by mouth Daily., Disp: , Rfl:   •  Golimumab (SIMPONI SC), Inject  under the skin., Disp: , Rfl:   •  methotrexate (RHEUMATREX) 2.5 MG tablet, Take 2.5 mg by mouth 3 (Three) Doses Each Week. Take Doses 12 (Twelve) Hours Apart., Disp: , Rfl:   •  " "sertraline (ZOLOFT) 100 MG tablet, Take 100 mg by mouth Daily., Disp: , Rfl:   •  tamoxifen (NOLVADEX) 20 MG chemo tablet, Take  by mouth Daily., Disp: , Rfl:      Allergies:  No Known Allergies    Physical Examination:  /84   Pulse 61   Ht 157.5 cm (62\")   Wt 69.9 kg (154 lb)   SpO2 99%   BMI 28.17 kg/m²   General Appearance:  Patient is in no distress.  She is well kept and has a  average  build.   Psychiatric:  Patient with appropriate mood and affect. Alert and oriented to self, time, and place.    Breast, RIGHT:  small sized, symmetric with the contralateral side.  Breast skin is without erythema, edema, rashes.  There are no visible abnormalities upon inspection during the arm-raising maneuver or with hands on hips in the sitting position. There is no nipple retraction, discharge or nipple/areolar skin changes.There are no masses palpable in the sitting or supine positions.    Breast, LEFT:   small sized, symmetric with the contralateral side.  Breast skin is without erythema, edema, rashes.  There is a hypertrophic transverse incision from where we did a punch biopsy left breast upper outer quadrant revealed atypical nevus There are no visible abnormalities upon inspection during the arm-raising maneuver or with hands on hips in the sitting position. There is no nipple retraction, discharge or nipple/areolar skin changes.There are no masses palpable in the sitting or supine positions. The LLQ breast hematoma that was present post biopsy is not palpable today. No skin cahgens in the region. No mass in the region.    Lymphatic:  There is no axillary, cervical, infraclavicular, or supraclavicular adenopathy bilaterally.  Eyes:  Pupils are round and reactive to light.  Cardiovascular:  Heart rate and rhythm are regular.  Respiratory:  Lungs are clear bilaterally with no crackles or wheezes in any lung field.  Gastrointestinal:  Abdomen is soft, nondistended, and nontender.  Musculoskeletal:  Good " strength in all 4 extremities.  There is good range of motion in both shoulders.   Skin:  No new skin lesions or rashes on the skin excluding the breast (see breast exam above).        Imagin14        [facility]         Digital Screening          Juan Luis Arauz  Birads 2 Benign.  Breast Composition: Heterogeneously dense.    08/11/15      [facility]         Breast Bilateral MRI          Juan Luis Arauz  Birads 1 Negative.    16                        [facility]                            BILATERAL MRI BREAST                          JUAN LUIS ARAUZ  IMPRESSION:  NO MR signs of malignancy in either breast.  BI-RADS 1 NEGATIVE    16                                  [facility]                         SCREENING MG BILATERAL               JUAN LUIS ARAUZ  BIRADS 1 NEGATIVE  The breasts are heterogeneously dense.    [facility] 2017 bilateral screening mammogram with 3-D.  Heterogenous leg dense breast tissue.  BI-RADS 1.    [facility] 2017 bilateral breast MRI:  There is a 5 mm focus of suspicious enhancement in the lower outer left breast approximate 4 cm from the nipple axillary lymph nodes are normal in appearance.  Physician directed ultrasound could be performed chromic recommend either ultrasound guided or MRI directed biopsy. We will ararnge for an US, US biopsy or MRI guided biopsy.    Left breast ultrasound: In the 4 o'clock position of the left breast there is a focus of duct ectasia measuring 5 mm.  In the 3:00 location there is a 5.6 mm probable internal mammary lymph node.  Conclusion the duct ectasia has a benign appearance.  It is possible that the benign appearing intramammary lymph node could represent a suspicious focus of enhancement on the recent MRI.  So would recommend MRI guided biopsy: BI-RADS IVb.  We will arrange for an MRI guided biopsy at [facility] prior to seeing me back    Pathology:  Pathology from punch biopsy returned as nevus with mild cytologic  atypia.  i called to let her know.   1-16-17  [facility]  PATHOLOGY  JUAN LUIS HANEY  Final Diagnosis   LEFT BREAST BIOPSY (SKIN):  SKIN WITH COMPOUND NEVUS WITH MILD CYTOLOGIC ATYPIA.   MARGIN FREE.       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   Seeing her back in approximately 6 months.       7-28-16                         [facility] MED ONC                           JUAN LUIS CROUCH MD     PLAN:  She is tolerating tamoxifen well with no adverse events or complications. I will plan on seeing her back in approximately 6 months.    Medical oncology note dated August 29, 2017 Dr. Everardo Pappas.  Agree with annual MRI and mammogram.  At this time she would like to not pursue additional prophylactic antiestrogen.  There is no information available at this time demonstrating the efficacy of concerns or aromatase inhibitors with this specific mutation.  With a long-term strategy of risk reduction, I suggested we consider tamoxifen again in the future.  Also, at the time of menopause 12 aromatase inhibitors would be appropriate.  Follow-up with me as needed.  We agree on a routine visit in 1-2 years.    [facility] note dated January 2, 2018 MRI guided breast biopsy: 10-gauge Encore multiple core biopsies.  M-shaped marker placed.  Addendum will be provided when the pathology is available.    Procedures:  Punch biopsy skin lesion 1-12-17:  Indication:  skin lesion of indeterminate significance  Location: LEFT breast UOQ,  inner ring  Consent:  The risks, benefits, and alternatives to the procedure were discussed with the patient, who understood and wished to proceed.  The risks described  included, but were not limited to, bleeding and infection.  Description of Procedure:   After the patient was positioned supine on the procedure table, I prepped and draped the affected breast skin in sterile fashion.  I anesthetized the affected skin and subcutaneous tissue with 1% lidocaine with epinephrine.  I then I then took a 8mm punch biopsy of the affected skin.   Manual compression was held for 5 minutes, 3x 4-0 nylon sutures were placed to approximate the skin edges. Antibacterial ointment  and a sterile dressing were applied.  Tolerance: The patient tolerated the procedure well.  Disposition: We will see her back within a week to discuss her pathology.    Assessment:   Diagnosis Plan   1. Atypical nevus     2. Genetic susceptibility to ovarian cancer     3. FH: breast cancer     4. Fibrocystic disease of left breast     5. Abnormal MRI, breast     6. Breast pain        1-  LEFT breast UOQ, inner ring, 5mm hyperpigmented lesion with some border irregularity and heterogeneity of color   - removed with punch 1-11-17    2-  JAMES: c.[id number]- diagnosed via MyRisk from Dr Tana Erwin Tamoxifen since 1-2016- saw Dr Pappas- stopped this 2017.    3-  mother age 49    4-5  MRI in December 2017 [facility] lower outer quadrant 5 mm area of enhancement, no ultrasound correlate.  MRI guided biopsy showed benign breast with columnar cell change apically and metaplasia and stromal fibrosis.    6-  LEFt breast pain lateral and inferior at the site of her post biopsy hematoma in 1-2018- persistent focal discomfort    Plan:  Yanni  And I reviewed her history, pathology report, and current symptoms as well as her examination.  There are no worrisome findings on her examination.  I did tell her that the focal pain at the biopsy site in the context of her large hematoma and mammotomy that healed by secondary intention is related to fibrosis and nerve ingrowth.  I recommended ibuprofen, vitamin E 400 units once  or twice a day, a compression bra, and deep tissue massage.  She understood and was agreeable.  She is due to see me back in December after her MRI and mammogram.  She is supposed to see Dr. Pappas back every year or so for follow-up.  She is not currently on tamoxifen but he plans that as part of her treatment in the future.    We previously discussed that the JAMES mutation that she carries harbors a lifetime risk of 17-52%, but likely closer to the 52% end ofthe spectrum due to the exact mutation.  We discussed that Iwould not necessarily recommend surgical risk reduction for this level of risk.    She has seen Dr. Erwin as well as Dr. Pappas.  Dr. Erwin initially started her on tamoxifen in January 2016, and she stopped this after seeing in talking with Dr. Pappas.  She has a follow-up with Dr. Pappas in 1-2 years per his note.    I will arrange for a bilateral screening mammogram and MRI for December 8, 2018 at [facility] and to see us back after.  She does have surveillance with her dermatologist for the atypical nevus for annual skin screening.  I did ask her to continue her self breast exam and to call us in the interim with any concerns or changes and we'd be happy to see her back sooner.           Naida Mosqueda MD  We spent 20 minutes in visit today, 15 in face to face .    Next Appointment:  Return for Next scheduled follow up, after imaging.      EMR Dragon/transcription disclaimer:    Much of this encounter note is an electronic transcription/translocation of spoken language to printed text.  The electronic translation of spoken language may permit erroneous, or at times, nonsensical words or phrases to be inadvertently transcribed.  Although I have reviewed the note from such areas, some may still exist.

## 2018-12-14 ENCOUNTER — TELEPHONE (OUTPATIENT)
Dept: SURGERY | Facility: CLINIC | Age: 43
End: 2018-12-14

## 2018-12-14 NOTE — TELEPHONE ENCOUNTER
Precaranza for Yanni Arauz for Bilateral Breast MRI w wo contrast  At [facility] 12-26-18 @    [facility] # [id number]  Good 12-14-18 thru 1-12-19      kdl

## 2018-12-26 ENCOUNTER — CONVERSION ENCOUNTER (OUTPATIENT)
Dept: MAMMOGRAPHY | Facility: HOSPITAL | Age: 43
End: 2018-12-26

## 2018-12-28 ENCOUNTER — TELEPHONE (OUTPATIENT)
Dept: SURGERY | Facility: CLINIC | Age: 43
End: 2018-12-28

## 2018-12-28 DIAGNOSIS — R92.8 ABNORMAL MRI, BREAST: Primary | ICD-10-CM

## 2018-12-28 NOTE — TELEPHONE ENCOUNTER
[facility] bilateral screening mammogram with 3-D dated December 26, 2018: Left breast is benign.  Right breast shows focal asymmetry in the central right breast, 4 cm from the nipple.  BI-RADS 0.  Heterogenously dense tissue.  [facility] bilateral breast MRI December 26, 2018: Left breast no suspicious finding.  Right breast a 1 cm by 8mm irregular area of non-masslike enhancement central right breast, 4.5 cm from the nipple corresponds to an area of focal asymmetry in the central right breast on same-day mammogram.  BI-RADS 0 recommend diagnostic mammogram and ultrasound.  We will arrange for a right diagnostic mammogram and right ultrasound prior to her seeing me back.    [facility] right diagnostic mammogram with 3-D and right ultrasound: There is a 1 cm developing asymmetry in the central right breast.  On mammogram.  On ultrasound there are several benign cyst in the central right breast which measure up to 6 mm.  These correspond to the nonenhancing cysts on MRI.  The mammogram and MRI could correspond to an inflamed cyst but tissue sampling is recommended.  BI-RADS 4B recommend MRI guided right breast biopsy.    We will ararnge and see her back after, or see her back for office visit prior.

## 2018-12-28 NOTE — TELEPHONE ENCOUNTER
Scheduled Rt 3D Diag Mammo and Rt Breast U/S at [facility] 1-3-19 arrive 10:00    Return to see Dr JIMENEZ  1-16-19 arrive 11:15    Spoke to pt she v/u with appts  susan

## 2019-01-03 ENCOUNTER — HOSPITAL ENCOUNTER (OUTPATIENT)
Dept: MAMMOGRAPHY | Facility: HOSPITAL | Age: 44
Discharge: HOME | End: 2019-01-03
Attending: SURGERY

## 2019-01-04 ENCOUNTER — TELEPHONE (OUTPATIENT)
Dept: SURGERY | Facility: CLINIC | Age: 44
End: 2019-01-04

## 2019-01-04 NOTE — TELEPHONE ENCOUNTER
I spoke with patient, she would like to meet with Dr. Mosqueda before MR biopsy @ [facility].     We have her coming in to see you before bx Wednesday 1/16/19.   I will order her images from [facility].   lml

## 2019-01-11 ENCOUNTER — TELEPHONE (OUTPATIENT)
Dept: SURGERY | Facility: CLINIC | Age: 44
End: 2019-01-11

## 2019-01-11 ENCOUNTER — OFFICE VISIT (OUTPATIENT)
Dept: SURGERY | Facility: CLINIC | Age: 44
End: 2019-01-11

## 2019-01-11 VITALS
DIASTOLIC BLOOD PRESSURE: 87 MMHG | BODY MASS INDEX: 27.53 KG/M2 | SYSTOLIC BLOOD PRESSURE: 126 MMHG | HEIGHT: 62 IN | OXYGEN SATURATION: 99 % | WEIGHT: 149.6 LBS | HEART RATE: 76 BPM

## 2019-01-11 DIAGNOSIS — R92.8 ABNORMAL MRI, BREAST: Primary | ICD-10-CM

## 2019-01-11 DIAGNOSIS — N60.12 FIBROCYSTIC DISEASE OF LEFT BREAST: ICD-10-CM

## 2019-01-11 DIAGNOSIS — D22.9 ATYPICAL NEVUS: ICD-10-CM

## 2019-01-11 DIAGNOSIS — Z80.3 FH: BREAST CANCER: ICD-10-CM

## 2019-01-11 DIAGNOSIS — Z15.02 GENETIC SUSCEPTIBILITY TO OVARIAN CANCER: ICD-10-CM

## 2019-01-11 DIAGNOSIS — R92.8 ABNORMAL MAMMOGRAM: ICD-10-CM

## 2019-01-11 PROCEDURE — 99213 OFFICE O/P EST LOW 20 MIN: CPT | Performed by: SURGERY

## 2019-01-11 RX ORDER — HYDROXYCHLOROQUINE SULFATE 200 MG/1
200 TABLET, FILM COATED ORAL 2 TIMES DAILY
COMMUNITY

## 2019-01-11 NOTE — PROGRESS NOTES
Chief Complaint: Yanni Arauz is a 43 y.o. female who was seen in consultation at the request of Hardeep Pelaez, *  for Neoplasm of uncertain behavior of skin  and a followup visit    History of Present Illness:  Patient presents with a newly diagnosed JAMES mutation.   Her mother had breast cancer diagnosed timothy ge 49, so Dr Fajardo sent a My Risk panel, which returend as positive for an JAMES mutation.      She noted no masses, skin changes, nipple discharge, nipple changes prior to her most recent imaging.   Her most recent imaging includes:  10/29/12        [facility]         Mammo Screening Bilateral            Yanni Arauz.  The breasts are heterogenously dense.  Ill-defined subcentimeter opacities are seen in the mid medial left breast on the CC view, and in the mid inferior right breast on the MLO view.  IMPRESSION:  Birads 0.    11/13/12        [facility]         Mammo Diagnostic Bilateral            Yanni Arauz.  The previously noted asymmetries int he medial left breast and inferior right breast do not persist on the additional views.   Birads 2.     10/30/13       [facility]         Mammo Screening Bilateral w/CAD           Yanni Arauz.  Birads 2.    11/20/14        [facility]         Digital Screening          Yanni Arauz.  Birads 2 Benign.  Breast Composition: Heterogeneously dense.      We then arranged for her to have a breast MRI, as follows:    08/11/15      [facility]         Breast Bilateral MRI          Yanni Arauz.  Birads 1 Negative.    She has not had  a breast biopsy in the past.  She has her uterus and ovaries, is premenopausal, and takes no hormones.  Her family history includes the following: Shehas one daughter, one son, one sister, 2 MA, 1 MU, 4 PA, 3 PU. Her mother had breast cancer diagnosed at age 49, alive in 2015.  No other breast and no ovarian cancer in her family.  She is here for evaluation.       In the interim,  Yanni Arauz  has done well.  She has noted no changes in her breast exam. No  new masses, skin changes, nipple changes, nipple discharge either breast.   She denies headache, bone pain, belly pain, cough, changes in vision or gait.  Her most recent imaging includes the following:  [facility] November 29, 2016 screening mammogram.  Heterogenous a dense.  BI-RADS 1 screening MRI, no  evidence of malignancy.      In the interim,  Yanni Arauz  has done well.  Her punch biopsy returned as a nevus with mild cytologic atypia.  She has seen Dr. Vaca her dermatology in follow-up about this.  Medical oncology note dated August 29, 2017 Dr. Everardo Pappas.  Agree with annual MRI and mammogram.  At this time she would like to not pursue additional prophylactic antiestrogen.  There is no information available at this time demonstrating the efficacy of concerns or aromatase inhibitors with this specific mutation.  With a long-term strategy of risk reduction, I suggested we consider tamoxifen again in the future.  Also, at the time of menopause 12 aromatase inhibitors would be appropriate.  Follow-up with me as needed.  We agree on a routine visit in 1-2 years.    She has noted no changes in her breast exam. No new masses, skin changes, nipple changes, nipple discharge either breast.   She denies headache, bone pain, belly pain, cough, changes in vision or gait.  Her most recent imaging includes the following:  [facility] December 7, 2017 bilateral screening mammogram with 3-D.  Heterogenous leg dense breast tissue.  BI-RADS 1.  [facility] December 7, 2017 bilateral breast MRI:  There is a 5 mm focus of suspicious enhancement in the lower outer left breast approximate 4 cm from the nipple axillary lymph nodes are normal in appearance.  Physician directed ultrasound could be performed chromic recommend either ultrasound guided or MRI directed biopsy.    Left breast ultrasound: In the 4 o'clock position of the left breast there is a focus of duct ectasia measuring 5 mm.  In  the 3:00 location there is a 5.6 mm probable internal mammary lymph node.  Conclusion the duct ectasia has a benign appearance.  It is possible that the benign appearing intramammary lymph node could represent a suspicious focus of enhancement on the recent MRI.  So would recommend MRI guided biopsy: BI-RADS IVb.  [facility] note dated January 2, 2018 MRI guided breast biopsy: 10-gauge Encore multiple core biopsies.  M-shaped marker placed.  Pathology returned as benign breast tissue with columnar cell change, apically metaplasia, and mild stromal fibrosis.  She tells me that she had some bruising and some drainage of the hematoma from her mammotomy.  Denies any erythema or warmth.      Interval History:  In the interim,  Yanni Arauz  has done well. SHe called due to persistent pain at the LEFt MRI guided bipsy site done at [facility] 1-2-18.  She tells me that the discomfort is present and has been bothering her more lately and thought shed get it checked.   She did have a sizeable hematoma, with blood leaking from mammotomy at the time of her procedure.    She has not taken any medication for this.  She has noted no other changes in her breast exam. No new masses, skin changes, nipple changes, nipple discharge either breast.           Review of Systems:  Review of Systems   Constitutional: Negative for unexpected weight change (2 lb wt loss).   All other systems reviewed and are negative.       Past Medical and Surgical History:  Breast Biopsy History:  Patient has not had a breast biopsy in the past.  Breast Cancer HIstory:  Patient does not have a past medical history of breast cancer.  Breast Operations, and year:  None   Obstetric/Gynecologic History:  Age menstrual periods began: 13  Patient is premenopausal, first day of last period: 11/8-13th  Number of pregnancies: 2  Number of live births: 2  Number of abortions or miscarriages: 0  Age of delivery of first child: 28  Patient did not breast  "feed.  Length of time taking birth control pills: since 17 on and off  Patient has never taken hormone replacement    Past Surgical History:   Procedure Laterality Date   • KNEE ARTHROPLASTY     • RIGHT OOPHORECTOMY      DUE TO ENDOMETRIOSIS   • WRIST FRACTURE SURGERY       Past Medical History:   Diagnosis Date   • Anxiety    • Rheumatoid arthritis (CMS/[facility])     ESTHELAUVENILE, AGE 6       Prior Hospitalizations, other than for surgery or childbirth, and year:  None     Social History     Socioeconomic History   • Marital status:      Spouse name: Not on file   • Number of children: Not on file   • Years of education: Not on file   • Highest education level: Not on file   Social Needs   • Financial resource strain: Not on file   • Food insecurity - worry: Not on file   • Food insecurity - inability: Not on file   • Transportation needs - medical: Not on file   • Transportation needs - non-medical: Not on file   Occupational History   • Not on file   Tobacco Use   • Smoking status: Never Smoker   Substance and Sexual Activity   • Alcohol use: Yes     Alcohol/week: 1.8 oz     Types: 3 Glasses of wine per week   • Drug use: No   • Sexual activity: Not on file   Other Topics Concern   • Not on file   Social History Narrative   • Not on file     Patient is .  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    Family History:  Family History   Problem Relation Age of Onset   • Hypertension Mother    • Hyperlipidemia Mother    • Breast cancer Mother 49   • Hypertension Father    • Hyperlipidemia Father    • Heart failure Father    • Cancer Father 71        COLORECTAL       Vital Signs:  /87 (BP Location: Left arm, Patient Position: Sitting, Cuff Size: Adult)   Pulse 76   Ht 157.5 cm (62\")   Wt 67.9 kg (149 lb 9.6 oz)   LMP  (LMP Unknown)   SpO2 99%   Breastfeeding? Unknown   BMI 27.36 kg/m²  " "    Medications:    Current Outpatient Medications:   •  folic acid (FOLVITE) 1 MG tablet, Take 1 mg by mouth Daily., Disp: , Rfl:   •  Golimumab (SIMPONI SC), Inject  under the skin., Disp: , Rfl:   •  hydroxychloroquine (PLAQUENIL) 200 MG tablet, Take  by mouth Daily., Disp: , Rfl:   •  methotrexate (RHEUMATREX) 2.5 MG tablet, Take 2.5 mg by mouth 3 (Three) Doses Each Week. Take Doses 12 (Twelve) Hours Apart., Disp: , Rfl:   •  sertraline (ZOLOFT) 100 MG tablet, Take 100 mg by mouth Daily., Disp: , Rfl:      Allergies:  No Known Allergies    Physical Examination:  /87 (BP Location: Left arm, Patient Position: Sitting, Cuff Size: Adult)   Pulse 76   Ht 157.5 cm (62\")   Wt 67.9 kg (149 lb 9.6 oz)   LMP  (LMP Unknown)   SpO2 99%   Breastfeeding? Unknown   BMI 27.36 kg/m²   General Appearance:  Patient is in no distress.  She is well kept and has a  average  build.   Psychiatric:  Patient with appropriate mood and affect. Alert and oriented to self, time, and place.    Breast, RIGHT:  small sized, symmetric with the contralateral side.  Breast skin is without erythema, edema, rashes.  There are no visible abnormalities upon inspection during the arm-raising maneuver or with hands on hips in the sitting position. There is no nipple retraction, discharge or nipple/areolar skin changes.There are no masses palpable in the sitting or supine positions.    Breast, LEFT:   small sized, symmetric with the contralateral side.  Breast skin is without erythema, edema, rashes.  There are no visible abnormalities upon inspection during the arm-raising maneuver or with hands on hips in the sitting position. There is no nipple retraction, discharge or nipple/areolar skin changes.There are no masses palpable in the sitting or supine positions.     Lymphatic:  There is no axillary, cervical, infraclavicular, or supraclavicular adenopathy bilaterally.  Eyes:  Pupils are round and reactive to light.  Cardiovascular:  Heart " rate and rhythm are regular.  Respiratory:  Lungs are clear bilaterally with no crackles or wheezes in any lung field.  Gastrointestinal:  Abdomen is soft, nondistended, and nontender.  Musculoskeletal:  Good strength in all 4 extremities.  There is good range of motion in both shoulders.   Skin:  No new skin lesions or rashes on the skin excluding the breast (see breast exam above).        Imagin14        [facility]         Digital Screening          Juan Luis Arauz  Birads 2 Benign.  Breast Composition: Heterogeneously dense.    08/11/15      [facility]         Breast Bilateral MRI          Juan Luis Arauz  Birads 1 Negative.    16                        [facility]                            BILATERAL MRI BREAST                          JUAN LUIS ARAUZ  IMPRESSION:  NO MR signs of malignancy in either breast.  BI-RADS 1 NEGATIVE    16                                  [facility]                         SCREENING MG BILATERAL               JUAN LUIS ARAUZ  BIRADS 1 NEGATIVE  The breasts are heterogeneously dense.    [facility] 2017 bilateral screening mammogram with 3-D.  Heterogenous leg dense breast tissue.  BI-RADS 1.    [facility] 2017 bilateral breast MRI:  There is a 5 mm focus of suspicious enhancement in the lower outer left breast approximate 4 cm from the nipple axillary lymph nodes are normal in appearance.  Physician directed ultrasound could be performed chromic recommend either ultrasound guided or MRI directed biopsy. We will ararnge for an US, US biopsy or MRI guided biopsy.    Left breast ultrasound: In the 4 o'clock position of the left breast there is a focus of duct ectasia measuring 5 mm.  In the 3:00 location there is a 5.6 mm probable internal mammary lymph node.  Conclusion the duct ectasia has a benign appearance.  It is possible that the benign appearing intramammary lymph node could represent a suspicious focus of enhancement on the recent MRI.  So would  recommend MRI guided biopsy: BI-RADS IVb.  We will arrange for an MRI guided biopsy at [facility] prior to seeing me back    [facility] bilateral screening mammogram with 3-D dated December 26, 2018: Left breast is benign.  Right breast shows focal asymmetry in the central right breast, 4 cm from the nipple.  BI-RADS 0.  Heterogenously dense tissue.  [facility] bilateral breast MRI December 26, 2018: Left breast no suspicious finding.  Right breast a 1 cm by 8mm irregular area of non-masslike enhancement central right breast, 4.5 cm from the nipple corresponds to an area of focal asymmetry in the central right breast on same-day mammogram.  BI-RADS 0 recommend diagnostic mammogram and ultrasound.  We will arrange for a right diagnostic mammogram and right ultrasound prior to her seeing me back.     [facility] right diagnostic mammogram with 3-D and right ultrasound: There is a 1 cm developing asymmetry in the central right breast.  On mammogram.  On ultrasound there are several benign cyst in the central right breast which measure up to 6 mm.  These correspond to the nonenhancing cysts on MRI.  The mammogram and MRI could correspond to an inflamed cyst but tissue sampling is recommended.  BI-RADS 4B recommend MRI guided right breast biopsy.     We will ararnge and see her back after, or see her back for office visit prior.          Pathology:  Pathology from punch biopsy returned as nevus with mild cytologic atypia.  i called to let her know.   1-16-17  [facility]  PATHOLOGY  JUAN LUIS HANEY  Final Diagnosis   LEFT BREAST BIOPSY (SKIN):  SKIN WITH COMPOUND NEVUS WITH MILD CYTOLOGIC ATYPIA.   MARGIN FREE.       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   Seeing her back in approximately 6 months.       7-28-16                         [facility] MED ONC                           JUAN LUIS CROUCH MD     PLAN:  She is tolerating tamoxifen well with no adverse events or complications. I will plan on seeing her back in approximately 6 months.    Medical oncology note dated August 29, 2017 Dr. Everardo Pappas.  Agree with annual MRI and mammogram.  At this time she would like to not pursue additional prophylactic antiestrogen.  There is no information available at this time demonstrating the efficacy of concerns or aromatase inhibitors with this specific mutation.  With a long-term strategy of risk reduction, I suggested we consider tamoxifen again in the future.  Also, at the time of menopause 12 aromatase inhibitors would be appropriate.  Follow-up with me as needed.  We agree on a routine visit in 1-2 years.    [facility] note dated January 2, 2018 MRI guided breast biopsy: 10-gauge Encore multiple core biopsies.  M-shaped marker placed.  Addendum will be provided when the pathology is available.    Procedures:  Punch biopsy skin lesion 1-12-17:  Indication:  skin lesion of indeterminate significance  Location: LEFT breast UOQ,  inner ring  Consent:  The risks, benefits, and alternatives to the procedure were discussed with the patient, who understood and wished to proceed.  The risks described included, but were not limited to, bleeding and infection.  Description of Procedure:   After the patient was positioned supine on the procedure table, I prepped and draped the affected breast skin in sterile fashion.  I anesthetized the affected skin and subcutaneous tissue with 1% lidocaine with epinephrine.  I then I then took a 8mm punch biopsy of the affected skin.   Manual compression was held for 5 minutes, 3x 4-0 nylon sutures were placed to approximate the  skin edges. Antibacterial ointment  and a sterile dressing were applied.  Tolerance: The patient tolerated the procedure well.  Disposition: We will see her back within a week to discuss her pathology.    Assessment:   Diagnosis Plan   1. Abnormal MRI, breast     2. Abnormal mammogram     3. Atypical nevus     4. Genetic susceptibility to ovarian cancer     5. FH: breast cancer     6. Fibrocystic disease of left breast      1-2  RIGHT central- seen on CC only on mammogram , MRI correlate of enhancing focus- BR4- no papable finding    3-  LEFT breast UOQ, inner ring, 5mm hyperpigmented lesion with some border irregularity and heterogeneity of color   - removed with punch 1-11-17  - followed by Dermatology specialists in [facility]    4-  JAMES: [id number]- diagnosed via MyRisk from Dr Tana Erwin Tamoxifen since 1-2016- saw Dr Pappas- stopped this 2017.    5-  mother age 49    6-  MRI in December 2017 [facility] lower outer quadrant 5 mm area of enhancement, no ultrasound correlate.  MRI guided biopsy showed benign breast with columnar cell change apically and metaplasia and stromal fibrosis.    Plan:  Yanni  And I reviewed her interval history, imaging reports and MRI and mammopathology report and exam together today.    We reviewed the new finding RIGHT breast and the images and I recommended biopsy as well.  SHe would like to have that done here in [place], so we will arrange and see ehr hetalk after.  We will call her in a 10 mg valium pre-procedure.    She is seeing Dr. Pappas back every year or so for follow-up.  She is not currently on tamoxifen but he mentioned an aromatase inhibitory for her possibly in the future.  She currently has a mirena and I asked her if her  kate considered a vasectomy as an alternative.    We previously discussed that the JAMES mutation that she carries harbors a lifetime risk of 17-52%, but likely closer to the 52% end ofthe spectrum due to the exact mutation.  We  discussed that Iwould not necessarily recommend surgical risk reduction for this level of risk.    She does have surveillance with her dermatologist for the atypical nevus for annual skin screening.    Will see her back after her biopsy.    She requested to have her routine surveillance imaging, the next of which will be due December 27, 2019, at [facility] as opposed to [facility].  We will make a note that probably order her next routine imaging.            Naida Mosqueda MD  We spent 20 minutes in visit today, 15 in face to face .    Next Appointment:  Return for post biopsy visit.      EMR Dragon/transcription disclaimer:    Much of this encounter note is an electronic transcription/translocation of spoken language to printed text.  The electronic translation of spoken language may permit erroneous, or at times, nonsensical words or phrases to be inadvertently transcribed.  Although I have reviewed the note from such areas, some may still exist.

## 2019-01-11 NOTE — TELEPHONE ENCOUNTER
"LM for pt to call:  Scheduled Rt Breast MRI guided Bx  With  @ [facility] 1-22-19  Arrive 6:30am    \"per mammo eat something light, and wear comfortable clothes.expect to be here for about 4hrs\"    Make sure no aspirin, Ibuprofen, Excedrin    Return to see Dr JIMENEZ 1-29-19 arrive 9:45    kdl      Pt called and confirmed her appts  kdl  "

## 2019-01-22 ENCOUNTER — HOSPITAL ENCOUNTER (OUTPATIENT)
Dept: MRI IMAGING | Facility: HOSPITAL | Age: 44
Discharge: HOME | End: 2019-01-22
Attending: SURGERY

## 2019-01-22 ENCOUNTER — HOSPITAL ENCOUNTER (OUTPATIENT)
Dept: MAMMOGRAPHY | Facility: HOSPITAL | Age: 44
Discharge: HOME | End: 2019-01-22
Admitting: RADIOLOGY

## 2019-01-22 VITALS
DIASTOLIC BLOOD PRESSURE: 91 MMHG | HEIGHT: 62 IN | BODY MASS INDEX: 26.68 KG/M2 | WEIGHT: 145 LBS | TEMPERATURE: 96.8 F | HEART RATE: 72 BPM | SYSTOLIC BLOOD PRESSURE: 135 MMHG | RESPIRATION RATE: 20 BRPM | OXYGEN SATURATION: 100 %

## 2019-01-22 DIAGNOSIS — R92.8 ABNORMAL MAMMOGRAM OF RIGHT BREAST: ICD-10-CM

## 2019-01-22 LAB — CREAT BLDA-MCNC: 0.7 MG/DL (ref 0.6–1.3)

## 2019-01-22 PROCEDURE — 77065 DX MAMMO INCL CAD UNI: CPT

## 2019-01-22 PROCEDURE — 88305 TISSUE EXAM BY PATHOLOGIST: CPT | Performed by: SURGERY

## 2019-01-22 PROCEDURE — 0 GADOBENATE DIMEGLUMINE 529 MG/ML SOLUTION: Performed by: SURGERY

## 2019-01-22 PROCEDURE — 82565 ASSAY OF CREATININE: CPT

## 2019-01-22 PROCEDURE — A4648 IMPLANTABLE TISSUE MARKER: HCPCS

## 2019-01-22 PROCEDURE — A9577 INJ MULTIHANCE: HCPCS | Performed by: SURGERY

## 2019-01-22 RX ORDER — LIDOCAINE HYDROCHLORIDE AND EPINEPHRINE 10; 10 MG/ML; UG/ML
20 INJECTION, SOLUTION INFILTRATION; PERINEURAL ONCE
Status: COMPLETED | OUTPATIENT
Start: 2019-01-22 | End: 2019-01-22

## 2019-01-22 RX ORDER — LIDOCAINE HYDROCHLORIDE 10 MG/ML
1 INJECTION, SOLUTION INFILTRATION; PERINEURAL ONCE
Status: COMPLETED | OUTPATIENT
Start: 2019-01-22 | End: 2019-01-22

## 2019-01-22 RX ADMIN — GADOBENATE DIMEGLUMINE 13 ML: 529 INJECTION, SOLUTION INTRAVENOUS at 08:23

## 2019-01-22 RX ADMIN — LIDOCAINE HYDROCHLORIDE AND EPINEPHRINE 20 ML: 10; 10 INJECTION, SOLUTION INFILTRATION; PERINEURAL at 08:50

## 2019-01-22 RX ADMIN — LIDOCAINE HYDROCHLORIDE 1 ML: 10 INJECTION, SOLUTION INFILTRATION; PERINEURAL at 08:47

## 2019-01-22 NOTE — NURSING NOTE
Patient was prescribed Valium 10 mg P.O. By Dr. Mosqueda. She brought Valium with her and I advised her to take it at 0715 a.m.

## 2019-01-22 NOTE — NURSING NOTE
Biopsy site to right lateral breast clear with Dermabond dry and intact. No firmness or swelling noted at or around biopsy site. Denies pain. Ice pack with protective covering applied to biopsy site. Discharge instructions discussed with understanding voiced by patient. Copies provided to patient. No distress noted. Patient awake, alert and oriented x4. To discharge via wheelchair per this nurse. To home via private vehicle accompanied by her mother.

## 2019-01-23 LAB
CYTO UR: NORMAL
LAB AP CASE REPORT: NORMAL
LAB AP CLINICAL INFORMATION: NORMAL
PATH REPORT.FINAL DX SPEC: NORMAL
PATH REPORT.GROSS SPEC: NORMAL

## 2019-01-25 ENCOUNTER — TELEPHONE (OUTPATIENT)
Dept: SURGERY | Facility: CLINIC | Age: 44
End: 2019-01-25

## 2019-01-25 NOTE — TELEPHONE ENCOUNTER
MRI guided breast biopsy [facility] January 24, 2019: Right breast 12:00, 8-gauge device, multiple specimens, a post biopsy MRI imaging demonstrates adequate sampling.  Post biopsy mammogram shows clip in the place of the area of concern.  The pathology is concordant with the imaging appearance.  Routine MRI follow-up is recommended.  Pathology returned as focal hyalinized fibrous mastopathy.  Duct ectasia with cyst formation.  No significant intraductal hyperplasia or atypia.

## 2019-01-29 ENCOUNTER — OFFICE VISIT (OUTPATIENT)
Dept: SURGERY | Facility: CLINIC | Age: 44
End: 2019-01-29

## 2019-01-29 VITALS
HEIGHT: 62 IN | SYSTOLIC BLOOD PRESSURE: 124 MMHG | OXYGEN SATURATION: 99 % | HEART RATE: 62 BPM | DIASTOLIC BLOOD PRESSURE: 82 MMHG | BODY MASS INDEX: 26.87 KG/M2 | WEIGHT: 146 LBS

## 2019-01-29 DIAGNOSIS — N60.12 FIBROCYSTIC DISEASE OF LEFT BREAST: ICD-10-CM

## 2019-01-29 DIAGNOSIS — Z80.3 FH: BREAST CANCER: ICD-10-CM

## 2019-01-29 DIAGNOSIS — R92.8 ABNORMAL MRI, BREAST: Primary | ICD-10-CM

## 2019-01-29 DIAGNOSIS — D22.9 ATYPICAL NEVUS: ICD-10-CM

## 2019-01-29 DIAGNOSIS — Z15.89 MONOALLELIC MUTATION OF ATM GENE: ICD-10-CM

## 2019-01-29 DIAGNOSIS — Z15.09 MONOALLELIC MUTATION OF ATM GENE: ICD-10-CM

## 2019-01-29 DIAGNOSIS — Z15.01 MONOALLELIC MUTATION OF ATM GENE: ICD-10-CM

## 2019-01-29 DIAGNOSIS — Z15.02 GENETIC SUSCEPTIBILITY TO OVARIAN CANCER: ICD-10-CM

## 2019-01-29 PROCEDURE — 99213 OFFICE O/P EST LOW 20 MIN: CPT | Performed by: SURGERY

## 2019-01-29 NOTE — PROGRESS NOTES
Chief Complaint: Yanni Arauz is a 43 y.o. female who was seen in consultation at the request of Hardeep Pelaez MD for Neoplasm of uncertain behavior of skin  and a followup visit    History of Present Illness:  Patient presents with a newly diagnosed JAMES mutation.   Her mother had breast cancer diagnosed timothy ge 49, so Dr Fajardo sent a My Risk panel, which returend as positive for an JAMES mutation.      She noted no masses, skin changes, nipple discharge, nipple changes prior to her most recent imaging.   Her most recent imaging includes:  10/29/12        [facility]         Mammo Screening Bilateral            Yanni Arauz.  The breasts are heterogenously dense.  Ill-defined subcentimeter opacities are seen in the mid medial left breast on the CC view, and in the mid inferior right breast on the MLO view.  IMPRESSION:  Birads 0.    11/13/12        [facility]         Mammo Diagnostic Bilateral            Yanni Arauz.  The previously noted asymmetries int he medial left breast and inferior right breast do not persist on the additional views.   Birads 2.     10/30/13       [facility]         Mammo Screening Bilateral w/CAD           Yanni Arauz.  Birads 2.    11/20/14        [facility]         Digital Screening          Yanni Arauz.  Birads 2 Benign.  Breast Composition: Heterogeneously dense.      We then arranged for her to have a breast MRI, as follows:    08/11/15      [facility]         Breast Bilateral MRI          Yanni Arauz.  Birads 1 Negative.    She has not had  a breast biopsy in the past.  She has her uterus and ovaries, is premenopausal, and takes no hormones.  Her family history includes the following: Shehas one daughter, one son, one sister, 2 MA, 1 MU, 4 PA, 3 PU. Her mother had breast cancer diagnosed at age 49, alive in 2015.  No other breast and no ovarian cancer in her family.  She is here for evaluation.       In the interim,  Yanni Arauz  has done well.  She has noted no changes in her breast exam. No  new masses, skin changes, nipple changes, nipple discharge either breast.   She denies headache, bone pain, belly pain, cough, changes in vision or gait.  Her most recent imaging includes the following:  [facility] November 29, 2016 screening mammogram.  Heterogenous a dense.  BI-RADS 1 screening MRI, no  evidence of malignancy.      In the interim,  Yanni Arauz  has done well.  Her punch biopsy returned as a nevus with mild cytologic atypia.  She has seen Dr. Vaca her dermatology in follow-up about this.  Medical oncology note dated August 29, 2017 Dr. Everardo Pappas.  Agree with annual MRI and mammogram.  At this time she would like to not pursue additional prophylactic antiestrogen.  There is no information available at this time demonstrating the efficacy of concerns or aromatase inhibitors with this specific mutation.  With a long-term strategy of risk reduction, I suggested we consider tamoxifen again in the future.  Also, at the time of menopause 12 aromatase inhibitors would be appropriate.  Follow-up with me as needed.  We agree on a routine visit in 1-2 years.    She has noted no changes in her breast exam. No new masses, skin changes, nipple changes, nipple discharge either breast.   She denies headache, bone pain, belly pain, cough, changes in vision or gait.  Her most recent imaging includes the following:  [facility] December 7, 2017 bilateral screening mammogram with 3-D.  Heterogenous leg dense breast tissue.  BI-RADS 1.  [facility] December 7, 2017 bilateral breast MRI:  There is a 5 mm focus of suspicious enhancement in the lower outer left breast approximate 4 cm from the nipple axillary lymph nodes are normal in appearance.  Physician directed ultrasound could be performed chromic recommend either ultrasound guided or MRI directed biopsy.    Left breast ultrasound: In the 4 o'clock position of the left breast there is a focus of duct ectasia measuring 5 mm.  In  the 3:00 location there is a 5.6 mm probable internal mammary lymph node.  Conclusion the duct ectasia has a benign appearance.  It is possible that the benign appearing intramammary lymph node could represent a suspicious focus of enhancement on the recent MRI.  So would recommend MRI guided biopsy: BI-RADS IVb.  [facility] note dated January 2, 2018 MRI guided breast biopsy: 10-gauge Encore multiple core biopsies.  M-shaped marker placed.  Pathology returned as benign breast tissue with columnar cell change, apically metaplasia, and mild stromal fibrosis.  She tells me that she had some bruising and some drainage of the hematoma from her mammotomy.  Denies any erythema or warmth.        In the interim,  Yanni Arauz  has done well. SHe called due to persistent pain at the LEFt MRI guided bipsy site done at [facility] 1-2-18.  She tells me that the discomfort is present and has been bothering her more lately and thought shed get it checked.   She did have a sizeable hematoma, with blood leaking from mammotomy at the time of her procedure.    She has not taken any medication for this.  She has noted no other changes in her breast exam. No new masses, skin changes, nipple changes, nipple discharge either breast.     Interval History:  In the interim Yanni had a right breast MRI guided biopsy 4 and abnormality seen on her most recent screening MRI from December 2018.    MRI guided breast biopsy [facility] January 24, 2019: Right breast 12:00, 8-gauge device, multiple specimens, a post biopsy MRI imaging demonstrates adequate sampling.  Post biopsy mammogram shows clip in the place of the area of concern.  The pathology is concordant with the imaging appearance.  Routine MRI follow-up is recommended.  Pathology returned as focal hyalinized fibrous mastopathy.  Duct ectasia with cyst formation.  No significant intraductal hyperplasia or atypia.      She reports significant bruising of the right  breast.  Denies any redness or drainage from the mammotomy.  She is here to review.      Review of Systems:  Review of Systems   Constitutional: Negative for unexpected weight change (3 LB WT LOSS).   All other systems reviewed and are negative.       Past Medical and Surgical History:  Breast Biopsy History:  Patient has not had a breast biopsy in the past.  Breast Cancer HIstory:  Patient does not have a past medical history of breast cancer.  Breast Operations, and year:  None   Obstetric/Gynecologic History:  Age menstrual periods began: 13  Patient is premenopausal, first day of last period: 11/8-13th  Number of pregnancies: 2  Number of live births: 2  Number of abortions or miscarriages: 0  Age of delivery of first child: 28  Patient did not breast feed.  Length of time taking birth control pills: since 17 on and off  Patient has never taken hormone replacement    Past Surgical History:   Procedure Laterality Date   • BREAST BIOPSY Left 2018    Benign   • KNEE ARTHROPLASTY     • RIGHT OOPHORECTOMY      DUE TO ENDOMETRIOSIS   • WRIST FRACTURE SURGERY       Past Medical History:   Diagnosis Date   • Anxiety    • Rheumatoid arthritis (CMS/[facility])     ESTHELAUVENIRAJESH, AGE 6       Prior Hospitalizations, other than for surgery or childbirth, and year:  None     Social History     Socioeconomic History   • Marital status:      Spouse name: Not on file   • Number of children: Not on file   • Years of education: Not on file   • Highest education level: Not on file   Social Needs   • Financial resource strain: Not on file   • Food insecurity - worry: Not on file   • Food insecurity - inability: Not on file   • Transportation needs - medical: Not on file   • Transportation needs - non-medical: Not on file   Occupational History   • Not on file   Tobacco Use   • Smoking status: Never Smoker   Substance and Sexual Activity   • Alcohol use: Yes     Alcohol/week: 1.8 oz     Types: 3 Glasses of wine per week   • Drug use: No  "  • Sexual activity: Not on file   Other Topics Concern   • Not on file   Social History Narrative   • Not on file     Patient is .  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    Family History:  Family History   Problem Relation Age of Onset   • Hypertension Mother    • Hyperlipidemia Mother    • Breast cancer Mother 49   • Hypertension Father    • Hyperlipidemia Father    • Heart failure Father    • Cancer Father 71        COLORECTAL       Vital Signs:  /82 (BP Location: Right arm, Patient Position: Sitting, Cuff Size: Adult)   Pulse 62   Ht 157.5 cm (62\")   Wt 66.2 kg (146 lb)   LMP  (LMP Unknown)   SpO2 99%   Breastfeeding? No   BMI 26.70 kg/m²      Medications:    Current Outpatient Medications:   •  folic acid (FOLVITE) 1 MG tablet, Take 1 mg by mouth Daily., Disp: , Rfl:   •  Golimumab (SIMPONI SC), Inject  under the skin., Disp: , Rfl:   •  hydroxychloroquine (PLAQUENIL) 200 MG tablet, Take  by mouth Daily., Disp: , Rfl:   •  methotrexate (RHEUMATREX) 2.5 MG tablet, Take 2.5 mg by mouth 3 (Three) Doses Each Week. Take Doses 12 (Twelve) Hours Apart., Disp: , Rfl:   •  sertraline (ZOLOFT) 100 MG tablet, Take 100 mg by mouth Daily., Disp: , Rfl:      Allergies:  No Known Allergies    Physical Examination:  /82 (BP Location: Right arm, Patient Position: Sitting, Cuff Size: Adult)   Pulse 62   Ht 157.5 cm (62\")   Wt 66.2 kg (146 lb)   LMP  (LMP Unknown)   SpO2 99%   Breastfeeding? No   BMI 26.70 kg/m²   General Appearance:  Patient is in no distress.  She is well kept and has a  average  build.   Psychiatric:  Patient with appropriate mood and affect. Alert and oriented to self, time, and place.    Breast, RIGHT:  small sized, symmetric with the contralateral side.  Breast skin is without erythema, edema, rashes.  There are no visible abnormalities upon inspection during the " arm-raising maneuver or with hands on hips in the sitting position. There is no nipple retraction, discharge or nipple/areolar skin changes.There are no masses palpable in the sitting or supine positions.  There are significant ecchymoses in the superior hemisphere of the breast both medially and laterally.  There is a well healing mammotomy lateral right breast with no erythema warmth or drainage.    Breast, LEFT:   small sized, symmetric with the contralateral side.  Breast skin is without erythema, edema, rashes.  There are no visible abnormalities upon inspection during the arm-raising maneuver or with hands on hips in the sitting position. There is no nipple retraction, discharge or nipple/areolar skin changes.There are no masses palpable in the sitting or supine positions.     Lymphatic:  There is no axillary, cervical, infraclavicular, or supraclavicular adenopathy bilaterally.  Eyes:  Pupils are round and reactive to light.  Cardiovascular:  Heart rate and rhythm are regular.  Respiratory:  Lungs are clear bilaterally with no crackles or wheezes in any lung field.  Gastrointestinal:  Abdomen is soft, nondistended, and nontender.  Musculoskeletal:  Good strength in all 4 extremities.  There is good range of motion in both shoulders.   Skin:  No new skin lesions or rashes on the skin excluding the breast (see breast exam above).        Imagin14        [facility]         Digital Screening          Juan Luis Arauz  Birads 2 Benign.  Breast Composition: Heterogeneously dense.    08/11/15      [facility]         Breast Bilateral MRI          Juan Luis Arauz  Birads 1 Negative.    16                        [facility]                            BILATERAL MRI BREAST                          JUAN LUIS AARUZ  IMPRESSION:  NO MR signs of malignancy in either breast.  BI-RADS 1 NEGATIVE    16                                  [facility]                         SCREENING MG BILATERAL               JUAN LUIS ARAUZ  BIRADS 1  NEGATIVE  The breasts are heterogeneously dense.    [facility] December 7, 2017 bilateral screening mammogram with 3-D.  Heterogenous leg dense breast tissue.  BI-RADS 1.    [facility] December 7, 2017 bilateral breast MRI:  There is a 5 mm focus of suspicious enhancement in the lower outer left breast approximate 4 cm from the nipple axillary lymph nodes are normal in appearance.  Physician directed ultrasound could be performed chromic recommend either ultrasound guided or MRI directed biopsy. We will ararnge for an US, US biopsy or MRI guided biopsy.    Left breast ultrasound: In the 4 o'clock position of the left breast there is a focus of duct ectasia measuring 5 mm.  In the 3:00 location there is a 5.6 mm probable internal mammary lymph node.  Conclusion the duct ectasia has a benign appearance.  It is possible that the benign appearing intramammary lymph node could represent a suspicious focus of enhancement on the recent MRI.  So would recommend MRI guided biopsy: BI-RADS IVb.  We will arrange for an MRI guided biopsy at [facility] prior to seeing me back    [facility] bilateral screening mammogram with 3-D dated December 26, 2018: Left breast is benign.  Right breast shows focal asymmetry in the central right breast, 4 cm from the nipple.  BI-RADS 0.  Heterogenously dense tissue.  [facility] bilateral breast MRI December 26, 2018: Left breast no suspicious finding.  Right breast a 1 cm by 8mm irregular area of non-masslike enhancement central right breast, 4.5 cm from the nipple corresponds to an area of focal asymmetry in the central right breast on same-day mammogram.  BI-RADS 0 recommend diagnostic mammogram and ultrasound.  We will arrange for a right diagnostic mammogram and right ultrasound prior to her seeing me back.     [facility] right diagnostic mammogram with 3-D and right ultrasound: There is a 1 cm developing asymmetry in the central  right breast.  On mammogram.  On ultrasound there are several benign cyst in the central right breast which measure up to 6 mm.  These correspond to the nonenhancing cysts on MRI.  The mammogram and MRI could correspond to an inflamed cyst but tissue sampling is recommended.  BI-RADS 4B recommend MRI guided right breast biopsy.     We will ararnge and see her back after, or see her back for office visit prior.          Pathology:  Pathology from punch biopsy returned as nevus with mild cytologic atypia.  i called to let her know.   1-16-17  [facility]  PATHOLOGY  JUAN LUIS HANEY  Final Diagnosis   LEFT BREAST BIOPSY (SKIN):  SKIN WITH COMPOUND NEVUS WITH MILD CYTOLOGIC ATYPIA.   MARGIN FREE.       [facility]  [facility]/juan pablo      MRI guided breast biopsy [facility] January 24, 2019: Right breast 12:00, 8-gauge device, multiple specimens, a post biopsy MRI imaging demonstrates adequate sampling.  Post biopsy mammogram shows clip in the place of the area of concern.  The pathology is concordant with the imaging appearance.  Routine MRI follow-up is recommended.  Pathology returned as focal hyalinized fibrous mastopathy.  Duct ectasia with cyst formation.  No significant intraductal hyperplasia or atypia.    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   Seeing her back in approximately 6 months.     7-28-16                         [facility] MED ONC                           JUAN LUIS CROUCH MD     PLAN:  She is tolerating tamoxifen well with no adverse events or complications. I will plan on seeing her back in approximately 6 months.  Medical oncology note dated August 29, 2017 Dr. Everardo Pappas.  Agree with annual MRI and  mammogram.  At this time she would like to not pursue additional prophylactic antiestrogen.  There is no information available at this time demonstrating the efficacy of concerns or aromatase inhibitors with this specific mutation.  With a long-term strategy of risk reduction, I suggested we consider tamoxifen again in the future.  Also, at the time of menopause 12 aromatase inhibitors would be appropriate.  Follow-up with me as needed.  We agree on a routine visit in 1-2 years.    [facility] note dated January 2, 2018 MRI guided breast biopsy: 10-gauge Encore multiple core biopsies.  M-shaped marker placed.  Addendum will be provided when the pathology is available.    Procedures:  Punch biopsy skin lesion 1-12-17:  Indication:  skin lesion of indeterminate significance  Location: LEFT breast UOQ,  inner ring  Consent:  The risks, benefits, and alternatives to the procedure were discussed with the patient, who understood and wished to proceed.  The risks described included, but were not limited to, bleeding and infection.  Description of Procedure:   After the patient was positioned supine on the procedure table, I prepped and draped the affected breast skin in sterile fashion.  I anesthetized the affected skin and subcutaneous tissue with 1% lidocaine with epinephrine.  I then I then took a 8mm punch biopsy of the affected skin.   Manual compression was held for 5 minutes, 3x 4-0 nylon sutures were placed to approximate the skin edges. Antibacterial ointment  and a sterile dressing were applied.  Tolerance: The patient tolerated the procedure well.  Disposition: We will see her back within a week to discuss her pathology.    Assessment:   Diagnosis Plan   1. Abnormal MRI, breast  MRI Breast Bilateral With & Without Contrast   2. Atypical nevus     3. Genetic susceptibility to ovarian cancer     4. FH: breast cancer  MRI Breast Bilateral With & Without Contrast    Mammo Screening Digital Tomosynthesis  Bilateral With CAD   5. Fibrocystic disease of left breast     6. Monoallelic mutation of JMAES gene  MRI Breast Bilateral With & Without Contrast    Mammo Screening Digital Tomosynthesis Bilateral With CAD        1  RIGHT central- seen on CC only on mammogram , MRI correlate of enhancing focus- BR4- no papable finding    MRI guided breast biopsy [facility] January 24, 2019:-focal hyalinized fibrous mastopathy.  Duct ectasia with cyst formation.  No significant intraductal hyperplasia or atypia. Concordant    2-  LEFT breast UOQ, inner ring, 5mm hyperpigmented lesion with some border irregularity and heterogeneity of color   - removed with punch 1-11-17  - followed by Dermatology specialists in [place]    3,6  JAMES: [id number]- diagnosed via MyRisk from Dr Tana Erwin Tamoxifen since 1-2016- saw Dr Pappas- stopped this 2017.    4-  mother age 49    5-  MRI in December 2017 [facility] lower outer quadrant 5 mm area of enhancement, no ultrasound correlate.  MRI guided biopsy showed benign breast with columnar cell change apically and metaplasia and stromal fibrosis.    Plan:  Yanni, her , and I reviewed her interval history, pathology and procedure reports from her interval MRI guided biopsy, and exam together today.    We discussed the nature of fibrous mastopathy duct ectasia and fibrocystic change.  We discussed the natural history of the hematoma in her right breast.  We discussed the timing for risk reducing medications.  She has seen Dr. Pappas and it is likely that she will schedule an appointment to get back into his office once she is in the postmenopausal..  At this juncture we will proceed with enhancement surveillance with MRI in addition to mammogram annually.  Her next imaging will be due December 27, 2019.  She prefers to have this at [facility].  We will arrange this for her and see her back after.      We previously discussed that the JAMES mutation that  she carries harbors a lifetime risk of 17-52%, but likely closer to the 52% end ofthe spectrum due to the exact mutation.  We discussed previously surgical risk reduction strategies as well as pharmacologic risk reduction and enhanced imaging surveillance.  She preferred to proceed with the latter.    She does have surveillance with her dermatologist for the atypical nevus for annual skin screening.    I've asked her to continue her self breast exam monthly and to call us in the interim with concerns and we'd be happy to see her back sooner.        Naida Mosqueda MD  We spent 20 minutes in visit today, 15 in face to face .    Next Appointment:  Return for Next scheduled follow up, after imaging.      EMR Dragon/transcription disclaimer:    Much of this encounter note is an electronic transcription/translocation of spoken language to printed text.  The electronic translation of spoken language may permit erroneous, or at times, nonsensical words or phrases to be inadvertently transcribed.  Although I have reviewed the note from such areas, some may still exist.

## 2019-05-20 ENCOUNTER — HOSPITAL ENCOUNTER (OUTPATIENT)
Dept: OTHER | Facility: HOSPITAL | Age: 44
Discharge: HOME | End: 2019-05-20
Attending: OBSTETRICS & GYNECOLOGY

## 2019-05-22 LAB — BACTERIA SPEC AEROBE CULT: NORMAL

## 2019-06-27 ENCOUNTER — TELEPHONE (OUTPATIENT)
Dept: SURGERY | Facility: CLINIC | Age: 44
End: 2019-06-27

## 2019-06-27 NOTE — TELEPHONE ENCOUNTER
12/27 MAMMO BH VIKRAM @ 9:45AM, MRI TO FOLLOW @ 10:45AM; F/U W/ CHANDRIKA 01/03/20 @ 12:30PM. LEFT PT VM TO CALL & CONFIRM.    KL

## 2019-07-01 ENCOUNTER — TELEPHONE (OUTPATIENT)
Dept: SURGERY | Facility: CLINIC | Age: 44
End: 2019-07-01

## 2019-07-15 ENCOUNTER — HOSPITAL ENCOUNTER (OUTPATIENT)
Dept: OTHER | Facility: HOSPITAL | Age: 44
Discharge: HOME | End: 2019-07-15
Attending: OBSTETRICS & GYNECOLOGY

## 2019-07-18 LAB
BACTERIA SPEC AEROBE CULT: ABNORMAL
CIPROFLOXACIN SUSC ISLT: >=8
CLINDAMYCIN SUSC ISLT: 0.25
DAPTOMYCIN SUSC ISLT: 0.25
DOXYCYCLINE SUSC ISLT: <=0.5
ERYTHROMYCIN SUSC ISLT: >=8
GENTAMICIN SUSC ISLT: <=0.5
LEVOFLOXACIN SUSC ISLT: 4
OXACILLIN SUSC ISLT: >=4
RIFAMPIN SUSC ISLT: <=0.5
TETRACYCLINE SUSC ISLT: <=1
TMP SMX SUSC ISLT: <=10
VANCOMYCIN SUSC ISLT: 1

## 2019-08-06 ENCOUNTER — HOSPITAL ENCOUNTER (OUTPATIENT)
Dept: OTHER | Facility: HOSPITAL | Age: 44
Discharge: HOME | End: 2019-08-06
Attending: OBSTETRICS & GYNECOLOGY

## 2019-08-08 LAB — BACTERIA SPEC AEROBE CULT: NORMAL

## 2019-11-04 ENCOUNTER — HOSPITAL ENCOUNTER (OUTPATIENT)
Dept: OTHER | Facility: HOSPITAL | Age: 44
Discharge: HOME | End: 2019-11-04
Attending: OBSTETRICS & GYNECOLOGY

## 2019-11-06 LAB
BACTERIA SPEC AEROBE CULT: ABNORMAL
BACTERIA SPEC AEROBE CULT: ABNORMAL

## 2019-12-22 ENCOUNTER — HOSPITAL ENCOUNTER (OUTPATIENT)
Dept: URGENT CARE | Facility: CLINIC | Age: 44
Discharge: HOME | End: 2019-12-22

## 2019-12-27 ENCOUNTER — TELEPHONE (OUTPATIENT)
Dept: SURGERY | Facility: CLINIC | Age: 44
End: 2019-12-27

## 2019-12-27 ENCOUNTER — HOSPITAL ENCOUNTER (OUTPATIENT)
Dept: MAMMOGRAPHY | Facility: HOSPITAL | Age: 44
Discharge: HOME | End: 2019-12-27

## 2019-12-27 ENCOUNTER — HOSPITAL ENCOUNTER (OUTPATIENT)
Dept: MRI IMAGING | Facility: HOSPITAL | Age: 44
Discharge: HOME | End: 2019-12-27
Admitting: SURGERY

## 2019-12-27 DIAGNOSIS — Z15.09 MONOALLELIC MUTATION OF ATM GENE: ICD-10-CM

## 2019-12-27 DIAGNOSIS — Z15.01 MONOALLELIC MUTATION OF ATM GENE: ICD-10-CM

## 2019-12-27 DIAGNOSIS — Z80.3 FH: BREAST CANCER: ICD-10-CM

## 2019-12-27 DIAGNOSIS — Z15.89 MONOALLELIC MUTATION OF ATM GENE: ICD-10-CM

## 2019-12-27 DIAGNOSIS — R92.8 ABNORMAL MRI, BREAST: ICD-10-CM

## 2019-12-27 PROCEDURE — 0 GADOBENATE DIMEGLUMINE 529 MG/ML SOLUTION: Performed by: SURGERY

## 2019-12-27 PROCEDURE — 77067 SCR MAMMO BI INCL CAD: CPT

## 2019-12-27 PROCEDURE — A9577 INJ MULTIHANCE: HCPCS | Performed by: SURGERY

## 2019-12-27 PROCEDURE — 77049 MRI BREAST C-+ W/CAD BI: CPT

## 2019-12-27 PROCEDURE — 77063 BREAST TOMOSYNTHESIS BI: CPT

## 2019-12-27 RX ADMIN — GADOBENATE DIMEGLUMINE 13 ML: 529 INJECTION, SOLUTION INTRAVENOUS at 12:28

## 2019-12-27 NOTE — TELEPHONE ENCOUNTER
[facility] bilateral screening mammogram with 3D December 27, 2019: Scattered fibroglandular density.  BI-RADS 1  [facility] bilateral breast MRI December 27, 2019- bilateral breast MRI with and without contrast BI-RADS 1

## 2020-01-21 ENCOUNTER — OFFICE VISIT (OUTPATIENT)
Dept: SURGERY | Facility: CLINIC | Age: 45
End: 2020-01-21

## 2020-01-21 VITALS
BODY MASS INDEX: 26.87 KG/M2 | SYSTOLIC BLOOD PRESSURE: 122 MMHG | OXYGEN SATURATION: 99 % | HEART RATE: 73 BPM | HEIGHT: 62 IN | WEIGHT: 146 LBS | DIASTOLIC BLOOD PRESSURE: 79 MMHG

## 2020-01-21 DIAGNOSIS — Z80.3 FH: BREAST CANCER: ICD-10-CM

## 2020-01-21 DIAGNOSIS — Z15.89 MONOALLELIC MUTATION OF ATM GENE: ICD-10-CM

## 2020-01-21 DIAGNOSIS — Z15.09 MONOALLELIC MUTATION OF ATM GENE: ICD-10-CM

## 2020-01-21 DIAGNOSIS — Z15.01 MONOALLELIC MUTATION OF ATM GENE: ICD-10-CM

## 2020-01-21 DIAGNOSIS — D22.9 ATYPICAL NEVUS: ICD-10-CM

## 2020-01-21 DIAGNOSIS — Z15.02 GENETIC SUSCEPTIBILITY TO OVARIAN CANCER: ICD-10-CM

## 2020-01-21 DIAGNOSIS — N60.11 FIBROCYSTIC DISEASE OF RIGHT BREAST: Primary | ICD-10-CM

## 2020-01-21 PROCEDURE — 99213 OFFICE O/P EST LOW 20 MIN: CPT | Performed by: SURGERY

## 2020-01-21 RX ORDER — LISINOPRIL 10 MG/1
10 TABLET ORAL DAILY
COMMUNITY
Start: 2019-11-05

## 2020-01-21 NOTE — PROGRESS NOTES
Chief Complaint: Yanni Arauz is a 44 y.o. female who was seen in consultation at the request of Hardeep Pelaez MD for Neoplasm of uncertain behavior of skin  and a followup visit    History of Present Illness:  Patient presents with a newly diagnosed JAMES mutation.   Her mother had breast cancer diagnosed timothy ge 49, so Dr Fajardo sent a My Risk panel, which returend as positive for an JAMES mutation.      She noted no masses, skin changes, nipple discharge, nipple changes prior to her most recent imaging.   Her most recent imaging includes:  10/29/12        [facility]         Mammo Screening Bilateral            Yanni Arauz.  The breasts are heterogenously dense.  Ill-defined subcentimeter opacities are seen in the mid medial left breast on the CC view, and in the mid inferior right breast on the MLO view.  IMPRESSION:  Birads 0.    11/13/12        [facility]         Mammo Diagnostic Bilateral            Yanni Arauz.  The previously noted asymmetries int he medial left breast and inferior right breast do not persist on the additional views.   Birads 2.     10/30/13       [facility]         Mammo Screening Bilateral w/CAD           Yanni Arauz.  Birads 2.    11/20/14        [facility]         Digital Screening          Yanni Arauz.  Birads 2 Benign.  Breast Composition: Heterogeneously dense.      We then arranged for her to have a breast MRI, as follows:    08/11/15      [facility]         Breast Bilateral MRI          Yanni Arauz.  Birads 1 Negative.    She has not had  a breast biopsy in the past.  She has her uterus and ovaries, is premenopausal, and takes no hormones.  Her family history includes the following: Shehas one daughter, one son, one sister, 2 MA, 1 MU, 4 PA, 3 PU. Her mother had breast cancer diagnosed at age 49, alive in 2015.  No other breast and no ovarian cancer in her family.  She is here for evaluation.       In the interim,  Yanni Arauz  has done well.  She has noted no changes in her breast exam. No  new masses, skin changes, nipple changes, nipple discharge either breast.   She denies headache, bone pain, belly pain, cough, changes in vision or gait.  Her most recent imaging includes the following:  [facility] November 29, 2016 screening mammogram.  Heterogenous a dense.  BI-RADS 1 screening MRI, no  evidence of malignancy.      In the interim,  Yanni Arauz  has done well.  Her punch biopsy returned as a nevus with mild cytologic atypia.  She has seen Dr. Vaca her dermatology in follow-up about this.  Medical oncology note dated August 29, 2017 Dr. Everardo Pappas.  Agree with annual MRI and mammogram.  At this time she would like to not pursue additional prophylactic antiestrogen.  There is no information available at this time demonstrating the efficacy of concerns or aromatase inhibitors with this specific mutation.  With a long-term strategy of risk reduction, I suggested we consider tamoxifen again in the future.  Also, at the time of menopause 12 aromatase inhibitors would be appropriate.  Follow-up with me as needed.  We agree on a routine visit in 1-2 years.    She has noted no changes in her breast exam. No new masses, skin changes, nipple changes, nipple discharge either breast.   She denies headache, bone pain, belly pain, cough, changes in vision or gait.  Her most recent imaging includes the following:  [facility] December 7, 2017 bilateral screening mammogram with 3-D.  Heterogenous leg dense breast tissue.  BI-RADS 1.  [facility] December 7, 2017 bilateral breast MRI:  There is a 5 mm focus of suspicious enhancement in the lower outer left breast approximate 4 cm from the nipple axillary lymph nodes are normal in appearance.  Physician directed ultrasound could be performed chromic recommend either ultrasound guided or MRI directed biopsy.    Left breast ultrasound: In the 4 o'clock position of the left breast there is a focus of duct ectasia measuring 5 mm.  In  the 3:00 location there is a 5.6 mm probable internal mammary lymph node.  Conclusion the duct ectasia has a benign appearance.  It is possible that the benign appearing intramammary lymph node could represent a suspicious focus of enhancement on the recent MRI.  So would recommend MRI guided biopsy: BI-RADS IVb.  [facility] note dated January 2, 2018 MRI guided breast biopsy: 10-gauge Encore multiple core biopsies.  M-shaped marker placed.  Pathology returned as benign breast tissue with columnar cell change, apically metaplasia, and mild stromal fibrosis.  She tells me that she had some bruising and some drainage of the hematoma from her mammotomy.  Denies any erythema or warmth.        In the interim,  Yanni Arauz  has done well. SHe called due to persistent pain at the LEFt MRI guided bipsy site done at [facility] 1-2-18.  She tells me that the discomfort is present and has been bothering her more lately and thought shed get it checked.   She did have a sizeable hematoma, with blood leaking from mammotomy at the time of her procedure.    She has not taken any medication for this.  She has noted no other changes in her breast exam. No new masses, skin changes, nipple changes, nipple discharge either breast.     In the interim Yanni had a right breast MRI guided biopsy 4 and abnormality seen on her most recent screening MRI from December 2018.    MRI guided breast biopsy [facility] January 24, 2019: Right breast 12:00, 8-gauge device, multiple specimens, a post biopsy MRI imaging demonstrates adequate sampling.  Post biopsy mammogram shows clip in the place of the area of concern.  The pathology is concordant with the imaging appearance.  Routine MRI follow-up is recommended.  Pathology returned as focal hyalinized fibrous mastopathy.  Duct ectasia with cyst formation.  No significant intraductal hyperplasia or atypia.    She reports significant bruising of the right breast.  Denies any  redness or drainage from the mammotomy.  She is here to review.    Interval History:  In the interim,  Yanni Arauz  has done well.  She has noted no changes in her breast exam. No new masses, skin changes, nipple changes, nipple discharge either breast.     Her most recent imaging includes the following:  [facility] bilateral screening mammogram with 3D December 27, 2019: Scattered fibroglandular density.  BI-RADS 1  [facility] bilateral breast MRI December 27, 2019- bilateral breast MRI with and without contrast BI-RADS 1    She is seen Dr. Pappas and they have decided to proceed with no additional antiestrogen therapy at this time.    Review of Systems:  Review of Systems   Constitutional: Negative for unexpected weight change (3 LB WT LOSS).   All other systems reviewed and are negative.       Past Medical and Surgical History:  Breast Biopsy History:  Patient has not had a breast biopsy in the past.  Breast Cancer HIstory:  Patient does not have a past medical history of breast cancer.  Breast Operations, and year:  None   Obstetric/Gynecologic History:  Age menstrual periods began: 13  Patient is premenopausal, first day of last period: 11/8-13th  Number of pregnancies: 2  Number of live births: 2  Number of abortions or miscarriages: 0  Age of delivery of first child: 28  Patient did not breast feed.  Length of time taking birth control pills: since 17 on and off  Patient has never taken hormone replacement    Past Surgical History:   Procedure Laterality Date   • BREAST BIOPSY Left 2018    Benign   • KNEE ARTHROPLASTY     • RIGHT OOPHORECTOMY      DUE TO ENDOMETRIOSIS   • WRIST FRACTURE SURGERY       Past Medical History:   Diagnosis Date   • Anxiety    • Rheumatoid arthritis (CMS/[facility])     TAMIKO, AGE 6       Prior Hospitalizations, other than for surgery or childbirth, and year:  None     Social History     Socioeconomic History   • Marital status:      Spouse name:  "Not on file   • Number of children: Not on file   • Years of education: Not on file   • Highest education level: Not on file   Tobacco Use   • Smoking status: Never Smoker   Substance and Sexual Activity   • Alcohol use: Yes     Alcohol/week: 3.0 standard drinks     Types: 3 Glasses of wine per week   • Drug use: No     Patient is .  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    Family History:  Family History   Problem Relation Age of Onset   • Hypertension Mother    • Hyperlipidemia Mother    • Breast cancer Mother 49   • Hypertension Father    • Hyperlipidemia Father    • Heart failure Father    • Cancer Father 71        COLORECTAL       Vital Signs:  /79 (BP Location: Right arm, Patient Position: Sitting, Cuff Size: Adult)   Pulse 73   Ht 157.5 cm (62\")   Wt 66.2 kg (146 lb)   LMP  (LMP Unknown)   SpO2 99%   Breastfeeding No   BMI 26.70 kg/m²      Medications:    Current Outpatient Medications:   •  Etanercept 50 MG/ML solution cartridge, Inject 50 mg under the skin into the appropriate area as directed., Disp: , Rfl:   •  folic acid (FOLVITE) 1 MG tablet, Take 1 mg by mouth Daily., Disp: , Rfl:   •  hydroxychloroquine (PLAQUENIL) 200 MG tablet, Take  by mouth Daily., Disp: , Rfl:   •  lisinopril (PRINIVIL,ZESTRIL) 10 MG tablet, , Disp: , Rfl:   •  methotrexate (RHEUMATREX) 2.5 MG tablet, Take 2.5 mg by mouth 3 (Three) Doses Each Week. Take Doses 12 (Twelve) Hours Apart., Disp: , Rfl:   •  sertraline (ZOLOFT) 100 MG tablet, Take 100 mg by mouth Daily., Disp: , Rfl:      Allergies:  No Known Allergies    Physical Examination:  /79 (BP Location: Right arm, Patient Position: Sitting, Cuff Size: Adult)   Pulse 73   Ht 157.5 cm (62\")   Wt 66.2 kg (146 lb)   LMP  (LMP Unknown)   SpO2 99%   Breastfeeding No   BMI 26.70 kg/m²   General Appearance:  Patient is in no distress.  She is " well kept and has a  average  build.   Psychiatric:  Patient with appropriate mood and affect. Alert and oriented to self, time, and place.    Breast, RIGHT:  small sized, symmetric with the contralateral side.  Breast skin is without erythema, edema, rashes.  There are no visible abnormalities upon inspection during the arm-raising maneuver or with hands on hips in the sitting position. There is no nipple retraction, discharge or nipple/areolar skin changes.There are no masses palpable in the sitting or supine positions.     Breast, LEFT:   small sized, symmetric with the contralateral side.  Breast skin is without erythema, edema, rashes.  There are no visible abnormalities upon inspection during the arm-raising maneuver or with hands on hips in the sitting position. There is no nipple retraction, discharge or nipple/areolar skin changes.There are no masses palpable in the sitting or supine positions.     Lymphatic:  There is no axillary, cervical, infraclavicular, or supraclavicular adenopathy bilaterally.  Eyes:  Pupils are round and reactive to light.  Cardiovascular:  Heart rate and rhythm are regular.  Respiratory:  Lungs are clear bilaterally with no crackles or wheezes in any lung field.  Gastrointestinal:  Abdomen is soft, nondistended, and nontender.  Musculoskeletal:  Good strength in all 4 extremities.  There is good range of motion in both shoulders.   Skin:  No new skin lesions or rashes on the skin excluding the breast (see breast exam above).    Imagin14        [facility]         Digital Screening          Juan Luis Arauz  Birads 2 Benign.  Breast Composition: Heterogeneously dense.    08/11/15      [facility]         Breast Bilateral MRI          Juan Luis Arauz  Birads 1 Negative.    16                        [facility]                            BILATERAL MRI BREAST                          JUAN LUIS ARAUZ  IMPRESSION:  NO MR signs of malignancy in either breast.  BI-RADS 1 NEGATIVE    16                                   [facility]                         SCREENING MG BILATERAL               JUAN LUIS HANEY  BIRADS 1 NEGATIVE  The breasts are heterogeneously dense.    [facility] December 7, 2017 bilateral screening mammogram with 3-D.  Heterogenous leg dense breast tissue.  BI-RADS 1.    [facility] December 7, 2017 bilateral breast MRI:  There is a 5 mm focus of suspicious enhancement in the lower outer left breast approximate 4 cm from the nipple axillary lymph nodes are normal in appearance.  Physician directed ultrasound could be performed chromic recommend either ultrasound guided or MRI directed biopsy. We will ararnge for an US, US biopsy or MRI guided biopsy.    Left breast ultrasound: In the 4 o'clock position of the left breast there is a focus of duct ectasia measuring 5 mm.  In the 3:00 location there is a 5.6 mm probable internal mammary lymph node.  Conclusion the duct ectasia has a benign appearance.  It is possible that the benign appearing intramammary lymph node could represent a suspicious focus of enhancement on the recent MRI.  So would recommend MRI guided biopsy: BI-RADS IVb.  We will arrange for an MRI guided biopsy at [facility] prior to seeing me back    [facility] bilateral screening mammogram with 3-D dated December 26, 2018: Left breast is benign.  Right breast shows focal asymmetry in the central right breast, 4 cm from the nipple.  BI-RADS 0.  Heterogenously dense tissue.  [facility] bilateral breast MRI December 26, 2018: Left breast no suspicious finding.  Right breast a 1 cm by 8mm irregular area of non-masslike enhancement central right breast, 4.5 cm from the nipple corresponds to an area of focal asymmetry in the central right breast on same-day mammogram.  BI-RADS 0 recommend diagnostic mammogram and ultrasound.  We will arrange for a right diagnostic mammogram and right ultrasound prior to her seeing me back.     [facility]  [facility] right diagnostic mammogram with 3-D and right ultrasound: There is a 1 cm developing asymmetry in the central right breast.  On mammogram.  On ultrasound there are several benign cyst in the central right breast which measure up to 6 mm.  These correspond to the nonenhancing cysts on MRI.  The mammogram and MRI could correspond to an inflamed cyst but tissue sampling is recommended.  BI-RADS 4B recommend MRI guided right breast biopsy.     [facility] bilateral screening mammogram with 3D December 27, 2019: Scattered fibroglandular density.  BI-RADS 1  [facility] bilateral breast MRI December 27, 2019- bilateral breast MRI with and without contrast BI-RADS 1    Pathology:  Pathology from punch biopsy returned as nevus with mild cytologic atypia.     1-16-17  [facility]  PATHOLOGY  JUAN LUIS HANEY  Final Diagnosis   LEFT BREAST BIOPSY (SKIN):  SKIN WITH COMPOUND NEVUS WITH MILD CYTOLOGIC ATYPIA.   MARGIN FREE.       [facility]/a/[facility]  [facility]/juan pablo      MRI guided breast biopsy [facility] January 24, 2019: Right breast 12:00, 8-gauge device, multiple specimens, a post biopsy MRI imaging demonstrates adequate sampling.  Post biopsy mammogram shows clip in the place of the area of concern.  The pathology is concordant with the imaging appearance.  Routine MRI follow-up is recommended.  Pathology returned as focal hyalinized fibrous mastopathy.  Duct ectasia with cyst formation.  No significant intraductal hyperplasia or atypia.    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   Seeing her back in approximately 6 months.     7-28-16                         [facility] MED ONC                           KELLY KING MD                             JUAN LUIS HANEY     PLAN:  She is tolerating tamoxifen well with no adverse events or complications. I will plan on seeing her back in approximately 6 months.  Medical oncology note dated August 29, 2017 Dr. Everardo Pappas.  Agree with annual MRI and mammogram.  At this time she would like to not pursue additional prophylactic antiestrogen.  There is no information available at this time demonstrating the efficacy of concerns or aromatase inhibitors with this specific mutation.  With a long-term strategy of risk reduction, I suggested we consider tamoxifen again in the future.  Also, at the time of menopause 12 aromatase inhibitors would be appropriate.  Follow-up with me as needed.  We agree on a routine visit in 1-2 years.    [facility] note dated January 2, 2018 MRI guided breast biopsy: 10-gauge Encore multiple core biopsies.  M-shaped marker placed.  Addendum will be provided when the pathology is available.    Procedures:  Punch biopsy skin lesion 1-12-17:  Indication:  skin lesion of indeterminate significance  Location: LEFT breast UOQ,  inner ring  Consent:  The risks, benefits, and alternatives to the procedure were discussed with the patient, who understood and wished to proceed.  The risks described included, but were not limited to, bleeding and infection.  Description of Procedure:   After the patient was positioned supine on the procedure table, I prepped and draped the affected breast skin in sterile fashion.  I anesthetized the affected skin and subcutaneous tissue with 1% lidocaine with epinephrine.  I then I then took a 8mm punch biopsy of the affected skin.   Manual compression was held for 5 minutes, 3x 4-0 nylon sutures were placed to approximate the skin edges. Antibacterial ointment  and a sterile dressing were applied.  Tolerance: The patient tolerated the procedure well.  Disposition: We will see her back within a week to discuss her pathology.    Assessment:   Diagnosis  Plan   1. Fibrocystic disease of right breast     2. Atypical nevus     3. Genetic susceptibility to ovarian cancer     4. FH: breast cancer  MRI Breast Bilateral With & Without Contrast    Mammo Screening Digital Tomosynthesis Bilateral With CAD   5. Monoallelic mutation of JAMES gene  MRI Breast Bilateral With & Without Contrast    Mammo Screening Digital Tomosynthesis Bilateral With CAD        1  --RIGHT central- seen on CC only on mammogram , MRI correlate of enhancing focus- BR4- no papable finding  MRI guided breast biopsy [facility] January 24, 2019:-focal hyalinized fibrous mastopathy.  Duct ectasia with cyst formation.  No significant intraductal hyperplasia or atypia. Concordant    --MRI in December 2017 [facility] lower outer quadrant 5 mm area of enhancement, no ultrasound correlate.  MRI guided biopsy showed benign breast with columnar cell change apically and metaplasia and stromal fibrosis.      2-  LEFT breast UOQ, inner ring, 5mm hyperpigmented lesion with some border irregularity and heterogeneity of color   - removed with punch 1-11-17  - followed by Dermatology specialists in [place]    3,6  JAMES: [id number]- diagnosed via Susie from Dr Tana Erwin Tamoxifen since 1-2016- saw Dr Pappas- stopped this 2017.  Consider an aromatase inhibitor once postmenopausal.  -Annual mammography and MRI.      4-  mother age 49      Plan:  Yanni and I reviewed her interval history, imaging, imaging reports and examination together today.    Her imaging and examination are in good order today.      We previously discussed that the JAMES mutation that she carries harbors a lifetime risk of 17-52%, but likely closer to the 52% end ofthe spectrum due to the exact mutation.  We discussed previously surgical risk reduction strategies as well as pharmacologic risk reduction and enhanced imaging surveillance.  She preferred to proceed with the latter.    She does have surveillance with her  dermatologist for the atypical nevus for annual skin screening.      I will arrange for her next mammogram and MRI for December 28, 2020 at [facility].  I've asked her to continue her self breast exam monthly and to call us in the interim with concerns and we'd be happy to see her back sooner.    She has been instructed to call Dr. Pappas once she is closer to the postmenopausal period to reconsider aromatase inhibitor as risk reducing intervention.    Naida Mosqueda MD  We spent 15  minutes in visit today, 10 in face to face .    Next Appointment:  Return for Next scheduled follow up, after imaging.      EMR Dragon/transcription disclaimer:    Much of this encounter note is an electronic transcription/translocation of spoken language to printed text.  The electronic translation of spoken language may permit erroneous, or at times, nonsensical words or phrases to be inadvertently transcribed.  Although I have reviewed the note from such areas, some may still exist.

## 2020-01-29 ENCOUNTER — TELEPHONE (OUTPATIENT)
Dept: SURGERY | Facility: CLINIC | Age: 45
End: 2020-01-29

## 2020-01-29 NOTE — TELEPHONE ENCOUNTER
MMG and MRI are scheduled on 12/28/2020.    MMG @ 9:00an.    MRI @ 10:45am, patient arrival 10:15am.    Called and left message with patient about appointment times, patient to call back and confirm.

## 2020-11-04 ENCOUNTER — TELEPHONE (OUTPATIENT)
Dept: SURGERY | Facility: CLINIC | Age: 45
End: 2020-11-04

## 2020-11-04 NOTE — TELEPHONE ENCOUNTER
Left message for patient to call me,  Would like her to see Anais Mcgovern NP  In January.      Starla    Patient is ok seeing Anais Mcgovern NP  Scheduled 1-25-21 arrive 11:15    Starla

## 2020-11-05 ENCOUNTER — HOSPITAL ENCOUNTER (OUTPATIENT)
Dept: LAB | Facility: HOSPITAL | Age: 45
Discharge: HOME | End: 2020-11-05

## 2020-11-05 LAB
ALT SERPL-CCNC: 13 U/L (ref 10–40)
AST SERPL-CCNC: 18 U/L (ref 15–50)
BASOPHILS # BLD AUTO: 0.03 10*3/UL (ref 0–0.2)
BASOPHILS NFR BLD AUTO: 0.6 % (ref 0–3)
CONV ABS IMM GRAN: 0.01 10*3/UL (ref 0–0.2)
CONV IMMATURE GRAN: 0.2 % (ref 0–1.8)
CREAT UR-MCNC: 0.79 MG/DL (ref 0.5–0.9)
CRP SERPL-MCNC: 0.9 MG/L (ref 0–5)
DEPRECATED RDW RBC AUTO: 43.4 FL (ref 36.4–46.3)
EOSINOPHIL # BLD AUTO: 0.03 10*3/UL (ref 0–0.7)
EOSINOPHIL # BLD AUTO: 0.6 % (ref 0–7)
ERYTHROCYTE [DISTWIDTH] IN BLOOD BY AUTOMATED COUNT: 12.7 % (ref 11.7–14.4)
ERYTHROCYTE [SEDIMENTATION RATE] IN BLOOD: 4 MM/H (ref 0–20)
HCT VFR BLD AUTO: 40.8 % (ref 37–47)
HGB BLD-MCNC: 13.7 G/DL (ref 12–16)
LYMPHOCYTES # BLD AUTO: 1.97 10*3/UL (ref 1–5)
LYMPHOCYTES NFR BLD AUTO: 36.9 % (ref 20–45)
MCH RBC QN AUTO: 31.2 PG (ref 27–31)
MCHC RBC AUTO-ENTMCNC: 33.6 G/DL (ref 33–37)
MCV RBC AUTO: 92.9 FL (ref 81–99)
MONOCYTES # BLD AUTO: 0.52 10*3/UL (ref 0.2–1.2)
MONOCYTES NFR BLD AUTO: 9.7 % (ref 3–10)
NEUTROPHILS # BLD AUTO: 2.78 10*3/UL (ref 2–8)
NEUTROPHILS NFR BLD AUTO: 52 % (ref 30–85)
NRBC CBCN: 0 % (ref 0–0.7)
PLATELET # BLD AUTO: 332 10*3/UL (ref 130–400)
PMV BLD AUTO: 9.6 FL (ref 9.4–12.3)
RBC # BLD AUTO: 4.39 10*6/UL (ref 4.2–5.4)
TSH SERPL-ACNC: 1.29 M[IU]/L (ref 0.27–4.2)
WBC # BLD AUTO: 5.34 10*3/UL (ref 4.8–10.8)

## 2020-12-28 ENCOUNTER — HOSPITAL ENCOUNTER (OUTPATIENT)
Dept: MAMMOGRAPHY | Facility: HOSPITAL | Age: 45
Discharge: HOME | End: 2020-12-28

## 2020-12-28 ENCOUNTER — HOSPITAL ENCOUNTER (OUTPATIENT)
Dept: MRI IMAGING | Facility: HOSPITAL | Age: 45
Discharge: HOME | End: 2020-12-28

## 2020-12-28 DIAGNOSIS — Z15.09 MONOALLELIC MUTATION OF ATM GENE: ICD-10-CM

## 2020-12-28 DIAGNOSIS — Z15.01 MONOALLELIC MUTATION OF ATM GENE: ICD-10-CM

## 2020-12-28 DIAGNOSIS — Z80.3 FH: BREAST CANCER: ICD-10-CM

## 2020-12-28 DIAGNOSIS — Z15.89 MONOALLELIC MUTATION OF ATM GENE: ICD-10-CM

## 2020-12-28 LAB — CREAT BLDA-MCNC: 0.7 MG/DL (ref 0.6–1.3)

## 2020-12-28 PROCEDURE — 77063 BREAST TOMOSYNTHESIS BI: CPT

## 2020-12-28 PROCEDURE — 77067 SCR MAMMO BI INCL CAD: CPT

## 2020-12-28 PROCEDURE — 82565 ASSAY OF CREATININE: CPT

## 2020-12-28 PROCEDURE — A9577 INJ MULTIHANCE: HCPCS | Performed by: SURGERY

## 2020-12-28 PROCEDURE — 0 GADOBENATE DIMEGLUMINE 529 MG/ML SOLUTION: Performed by: SURGERY

## 2020-12-28 PROCEDURE — 77049 MRI BREAST C-+ W/CAD BI: CPT

## 2020-12-28 RX ADMIN — GADOBENATE DIMEGLUMINE 15 ML: 529 INJECTION, SOLUTION INTRAVENOUS at 10:26

## 2021-01-15 NOTE — PROGRESS NOTES
Chief Complaint: Yanni Arauz is a 45 y.o. 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   Her mother had breast cancer diagnosed at age 49, so Dr Fajardo sent a My Risk panel, which retured as positive for an JAMES mutation.      She noted no masses, skin changes, nipple discharge, nipple changes prior to her most recent imaging.   Her most recent imaging includes:  10/29/12        [facility]         Mammo Screening Bilateral            Yanni Arauz.  The breasts are heterogenously dense.  Ill-defined subcentimeter opacities are seen in the mid medial left breast on the CC view, and in the mid inferior right breast on the MLO view.  IMPRESSION:  Birads 0.    11/13/12        [facility]         Mammo Diagnostic Bilateral            Yanni Arauz.  The previously noted asymmetries int he medial left breast and inferior right breast do not persist on the additional views.   Birads 2.     10/30/13       [facility]         Mammo Screening Bilateral w/CAD           Yanni Arauz.  Birads 2.    11/20/14        [facility]         Digital Screening          Yanni Arauz.  Birads 2 Benign.  Breast Composition: Heterogeneously dense.      We then arranged for her to have a breast MRI, as follows:    08/11/15      [facility]         Breast Bilateral MRI          Davonte Yanni MAX.  Birads 1 Negative.    She has her uterus and ovaries, is premenopausal, and takes no hormones.  Her family history includes the following: Shehas one daughter, one son, one sister, 2 MA, 1 MU, 4 PA, 3 PU. Her mother had breast cancer diagnosed at age 49, alive in 2015.  No other breast and no ovarian cancer in her family.      In the interim,  Yanni Arauz  has done well.  She has noted no changes in her breast exam. No new masses, skin changes, nipple changes, nipple discharge either  breast.   She denies headache, bone pain, belly pain, cough, changes in vision or gait.  Her most recent imaging includes the following:  [facility] November 29, 2016 screening mammogram.  Heterogenous a dense.  BI-RADS 1 screening MRI, no  evidence of malignancy.      In the interim,  Yanni Arauz  has done well.  Her punch biopsy returned as a nevus with mild cytologic atypia.  She has seen Dr. Vaca her dermatology in follow-up about this.  Medical oncology note dated August 29, 2017 Dr. Everardo Pappas.  Agree with annual MRI and mammogram.  At this time she would like to not pursue additional prophylactic antiestrogen.  There is no information available at this time demonstrating the efficacy of concerns or aromatase inhibitors with this specific mutation.  With a long-term strategy of risk reduction, I suggested we consider tamoxifen again in the future.  Also, at the time of menopause 12 aromatase inhibitors would be appropriate.  Follow-up with me as needed.  We agree on a routine visit in 1-2 years.    She has noted no changes in her breast exam. No new masses, skin changes, nipple changes, nipple discharge either breast.   She denies headache, bone pain, belly pain, cough, changes in vision or gait.  Her most recent imaging includes the following:  [facility] December 7, 2017 bilateral screening mammogram with 3-D.  Heterogenous leg dense breast tissue.  BI-RADS 1.  December 7, 2017, [facility] , bilateral breast MRI:  There is a 5 mm focus of suspicious enhancement in the lower outer left breast approximate 4 cm from the nipple axillary lymph nodes are normal in appearance.  Physician directed ultrasound could be performed chromic recommend either ultrasound guided or MRI directed biopsy.    Left breast ultrasound: In the 4 o'clock position of the left breast there is a focus of duct ectasia measuring 5 mm.  In the 3:00 location there is a 5.6 mm probable internal mammary  lymph node.  Conclusion the duct ectasia has a benign appearance.  It is possible that the benign appearing intramammary lymph node could represent a suspicious focus of enhancement on the recent MRI.  So would recommend MRI guided biopsy: BI-RADS IVb.     January 2, 2018 MRI guided left breast biopsy [facility]: 10-gauge Encore multiple core biopsies.  M-shaped marker placed.  Pathology returned as benign breast tissue with columnar cell change, apically metaplasia, and mild stromal fibrosis.  She tells me that she had some bruising and some drainage of the hematoma from her mammotomy.  Denies any erythema or warmth.      In the interim,  Yanni Arauz  has done well. SHe called due to persistent pain at the LEFt MRI guided bipsy site done at [facility] 1-2-18.  She tells me that the discomfort is present and has been bothering her more lately and thought shed get it checked.   She did have a sizeable hematoma, with blood leaking from mammotomy at the time of her procedure.    She has not taken any medication for this.  She has noted no other changes in her breast exam. No new masses, skin changes, nipple changes, nipple discharge either breast.     In the interim aYnni had a right breast MRI guided biopsy 4 and abnormality seen on her most recent screening MRI from December 2018.    January 24, 2019, MRI guided right breast biopsy [facility]: Right breast 12:00, 8-gauge device, multiple specimens, a post biopsy MRI imaging demonstrates adequate sampling.  Post biopsy mammogram shows clip in the place of the area of concern.  The pathology is concordant with the imaging appearance.  Routine MRI follow-up is recommended.  Pathology returned as focal hyalinized fibrous mastopathy.  Duct ectasia with cyst formation.  No significant intraductal hyperplasia or atypia.    She was last seen in clinic by Dr Mosqueda in 1/2020:  In the interim,  Yanni Arauz  has done well.  She has noted no changes in  her breast exam. No new masses, skin changes, nipple changes, nipple discharge either breast.   Her most recent imaging includes the following:  [facility] bilateral screening mammogram with 3D December 27, 2019: Scattered fibroglandular density.  BI-RADS 1  [facility] bilateral breast MRI December 27, 2019- bilateral breast MRI with and without contrast BI-RADS 1    She is seen Dr. Pappas and they have decided to proceed with no additional antiestrogen therapy at this time.    INTERVAL HISTORY:  No breast concerns, no health issues.  She completed her screening mammogram and breast MRI in 12/2020.  She did have covid but has recovered.    Review of Systems:  Review of Systems   Constitutional: Negative for unexpected weight change (3 LB WT LOSS).   All other systems reviewed and are negative.       Past Medical and Surgical History:  Breast Biopsy History:   January 2, 2018 MRI guided breast biopsy   January 24, 2019, MRI guided breast biopsy      Breast Cancer HIstory:  Patient does not have a past medical history of breast cancer.  Breast Operations, and year:  None   Obstetric/Gynecologic History:  Age menstrual periods began: 13  Patient is premenopausal, first day of last period: 11/8-13th  Number of pregnancies: 2  Number of live births: 2  Number of abortions or miscarriages: 0  Age of delivery of first child: 28  Patient did not breast feed.  Length of time taking birth control pills: since 17 on and off  Patient has never taken hormone replacement    Past Surgical History:   Procedure Laterality Date   • BREAST BIOPSY Left 2018    Benign   • KNEE ARTHROPLASTY     • RIGHT OOPHORECTOMY      DUE TO ENDOMETRIOSIS   • WRIST FRACTURE SURGERY       Past Medical History:   Diagnosis Date   • Anxiety    • Fibrocystic breast changes, bilateral 1/25/2021   • Rheumatoid arthritis (CMS/[facility])     TAMIKO, AGE 6       Prior Hospitalizations, other than for surgery or childbirth, and  "year:  None     Social History     Socioeconomic History   • Marital status:      Spouse name: Not on file   • Number of children: Not on file   • Years of education: Not on file   • Highest education level: Not on file   Tobacco Use   • Smoking status: Never Smoker   Substance and Sexual Activity   • Alcohol use: Yes     Alcohol/week: 3.0 standard drinks     Types: 3 Glasses of wine per week   • Drug use: No     Patient is .  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    Family History:  Family History   Problem Relation Age of Onset   • Hypertension Mother    • Hyperlipidemia Mother    • Breast cancer Mother 49   • Hypertension Father    • Hyperlipidemia Father    • Heart failure Father    • Cancer Father 71        COLORECTAL       Vital Signs:  /80 (BP Location: Right arm)   Pulse 77   Temp 98.4 °F (36.9 °C)   Ht 157.5 cm (62\")   Wt 73 kg (161 lb)   SpO2 99%   BMI 29.45 kg/m²      Medications:    Current Outpatient Medications:   •  Etanercept 50 MG/ML solution cartridge, Inject 50 mg under the skin into the appropriate area as directed., Disp: , Rfl:   •  folic acid (FOLVITE) 1 MG tablet, Take 1 mg by mouth Daily., Disp: , Rfl:   •  hydroxychloroquine (PLAQUENIL) 200 MG tablet, Take  by mouth Daily., Disp: , Rfl:   •  lisinopril (PRINIVIL,ZESTRIL) 10 MG tablet, , Disp: , Rfl:   •  methotrexate (RHEUMATREX) 2.5 MG tablet, Take 2.5 mg by mouth 3 (Three) Doses Each Week. Take Doses 12 (Twelve) Hours Apart., Disp: , Rfl:   •  sertraline (ZOLOFT) 100 MG tablet, Take 100 mg by mouth Daily., Disp: , Rfl:      Allergies:  No Known Allergies    Physical Examination:  /80 (BP Location: Right arm)   Pulse 77   Temp 98.4 °F (36.9 °C)   Ht 157.5 cm (62\")   Wt 73 kg (161 lb)   SpO2 99%   BMI 29.45 kg/m²   General Appearance:  Patient is in no distress.  She is well kept and has a  " average  build.   Psychiatric:  Patient with appropriate mood and affect. Alert and oriented to self, time, and place.    Breast, RIGHT:  small sized, symmetric with the contralateral side.  Breast skin is without erythema, edema, rashes.  There are no visible abnormalities upon inspection during the arm-raising maneuver or with hands on hips in the sitting position. There is no nipple retraction, discharge or nipple/areolar skin changes.There are no masses palpable in the sitting or supine positions.     Breast, LEFT:   small sized, symmetric with the contralateral side.  Breast skin is without erythema, edema, rashes.  There are no visible abnormalities upon inspection during the arm-raising maneuver or with hands on hips in the sitting position. There is no nipple retraction, discharge or nipple/areolar skin changes.There are no masses palpable in the sitting or supine positions.     Lymphatic:  There is no axillary, cervical, infraclavicular, or supraclavicular adenopathy bilaterally.  Eyes:  Pupils are round and reactive to light.  Cardiovascular:  Heart rate and rhythm are regular.  Respiratory:  Lungs are clear bilaterally with no crackles or wheezes in any lung field.  Gastrointestinal:  Abdomen is soft, nondistended, and nontender.  Musculoskeletal:  Good strength in all 4 extremities.  There is good range of motion in both shoulders.   Skin:  No new skin lesions or rashes on the skin excluding the breast (see breast exam above).    Imagin14        [facility]         Digital Screening          Juan Luis Arauz  Birads 2 Benign.  Breast Composition: Heterogeneously dense.    08/11/15      [facility]         Breast Bilateral MRI          Juan Luis Arauz  Birads 1 Negative.    16                        [facility]                            BILATERAL MRI BREAST                          JUAN LUIS ARAUZ  IMPRESSION:  NO MR signs of malignancy in either breast.  BI-RADS 1 NEGATIVE    16                                   [facility]                         SCREENING MG BILATERAL               JUAN LUIS HANEY  BIRADS 1 NEGATIVE  The breasts are heterogeneously dense.    [facility] December 7, 2017 bilateral screening mammogram with 3-D.  Heterogenous leg dense breast tissue.  BI-RADS 1.    [facility] December 7, 2017 bilateral breast MRI:  There is a 5 mm focus of suspicious enhancement in the lower outer left breast approximate 4 cm from the nipple axillary lymph nodes are normal in appearance.  Physician directed ultrasound could be performed chromic recommend either ultrasound guided or MRI directed biopsy. We will ararnge for an US, US biopsy or MRI guided biopsy.    Left breast ultrasound: In the 4 o'clock position of the left breast there is a focus of duct ectasia measuring 5 mm.  In the 3:00 location there is a 5.6 mm probable internal mammary lymph node.  Conclusion the duct ectasia has a benign appearance.  It is possible that the benign appearing intramammary lymph node could represent a suspicious focus of enhancement on the recent MRI.  So would recommend MRI guided biopsy: BI-RADS IVb.  We will arrange for an MRI guided biopsy at [facility] prior to seeing me back    [facility] bilateral screening mammogram with 3-D dated December 26, 2018: Left breast is benign.  Right breast shows focal asymmetry in the central right breast, 4 cm from the nipple.  BI-RADS 0.  Heterogenously dense tissue.  [facility] bilateral breast MRI December 26, 2018: Left breast no suspicious finding.  Right breast a 1 cm by 8mm irregular area of non-masslike enhancement central right breast, 4.5 cm from the nipple corresponds to an area of focal asymmetry in the central right breast on same-day mammogram.  BI-RADS 0 recommend diagnostic mammogram and ultrasound.  We will arrange for a right diagnostic mammogram and right ultrasound prior to her seeing me back.     [facility] right diagnostic  mammogram with 3-D and right ultrasound: There is a 1 cm developing asymmetry in the central right breast.  On mammogram.  On ultrasound there are several benign cyst in the central right breast which measure up to 6 mm.  These correspond to the nonenhancing cysts on MRI.  The mammogram and MRI could correspond to an inflamed cyst but tissue sampling is recommended.  BI-RADS 4B recommend MRI guided right breast biopsy.     [facility] bilateral screening mammogram with 3D December 27, 2019: Scattered fibroglandular density.  BI-RADS 1  [facility] bilateral breast MRI December 27, 2019- bilateral breast MRI with and without contrast BI-RADS 1    [facility] bilateral screening mammogram with 3D December 28, 2020: Scattered fibroglandular density.  BI-RADS 1  [facility] bilateral breast MRI December 28, 2020- bilateral breast MRI with and without contrast BI-RADS 2     Pathology:  Pathology from punch biopsy returned as nevus with mild cytologic atypia.     1-16-17  [facility]  PATHOLOGY  JUAN LUIS HANEY  Final Diagnosis   LEFT BREAST BIOPSY (SKIN):  SKIN WITH COMPOUND NEVUS WITH MILD CYTOLOGIC ATYPIA.   MARGIN FREE.       [facility]/a/[facility]  [facility]/juan pablo      MRI guided breast biopsy [facility] January 24, 2019: Right breast 12:00, 8-gauge device, multiple specimens, a post biopsy MRI imaging demonstrates adequate sampling.  Post biopsy mammogram shows clip in the place of the area of concern.  The pathology is concordant with the imaging appearance.  Routine MRI follow-up is recommended.  Pathology returned as focal hyalinized fibrous mastopathy.  Duct ectasia with cyst formation.  No significant intraductal hyperplasia or atypia.    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   Seeing her back in approximately 6 months.     7-28-16                         [facility] MED ONC                           JUAN LUIS CROUCH MD     PLAN:  She is tolerating tamoxifen well with no adverse events or complications. I will plan on seeing her back in approximately 6 months.  Medical oncology note dated August 29, 2017 Dr. Everardo Pappas.  Agree with annual MRI and mammogram.  At this time she would like to not pursue additional prophylactic antiestrogen.  There is no information available at this time demonstrating the efficacy of concerns or aromatase inhibitors with this specific mutation.  With a long-term strategy of risk reduction, I suggested we consider tamoxifen again in the future.  Also, at the time of menopause 12 aromatase inhibitors would be appropriate.  Follow-up with me as needed.  We agree on a routine visit in 1-2 years.    [facility] note dated January 2, 2018 MRI guided breast biopsy: 10-gauge Encore multiple core biopsies.  M-shaped marker placed.  Addendum will be provided when the pathology is available.    Procedures:  Punch biopsy skin lesion 1-12-17:  Indication:  skin lesion of indeterminate significance  Location: LEFT breast UOQ,  inner ring  Consent:  The risks, benefits, and alternatives to the procedure were discussed with the patient, who understood and wished to proceed.  The risks described included, but were not limited to, bleeding and infection.  Description of Procedure:   After the patient was positioned supine on the procedure table, I prepped and draped the affected breast skin in sterile fashion.  I anesthetized the affected skin and subcutaneous tissue with 1% lidocaine with epinephrine.  I then I then took a 8mm punch biopsy of the affected skin.   Manual compression was held for 5 minutes, 3x 4-0 nylon sutures were placed to approximate the  skin edges. Antibacterial ointment  and a sterile dressing were applied.  Tolerance: The patient tolerated the procedure well.  Disposition: We will see her back within a week to discuss her pathology.    Assessment:    1. Benign breast biopsy, right 1/2019, left 1/2018  --RIGHT central- seen on CC only on mammogram , MRI correlate of enhancing focus- BR4- no papable finding  MRI guided breast biopsy [facility] January 24, 2019:-focal hyalinized fibrous mastopathy.  Duct ectasia with cyst formation.  No significant intraductal hyperplasia or atypia. Concordant    --MRI in December 2017 [facility] lower outer quadrant 5 mm area of enhancement, no ultrasound correlate.  MRI guided biopsy showed benign breast with columnar cell change apically and metaplasia and stromal fibrosis.      2-  Left breast skin biopsy  LEFT breast UOQ, inner ring, 5mm hyperpigmented lesion with some border irregularity and heterogeneity of color   - removed with punch 1-11-17  - followed by Dermatology specialists in [facility]    3  Family history of breast cancer, mother age 49    4- Positive for mutation in  JAMES: [id number]- diagnosed via MyRisk from Dr Tana Erwin Tamoxifen since 1-2016- saw Dr Pappas- stopped this 2017.  Consider an aromatase inhibitor once postmenopausal.  -Annual mammography and MRI.      Plan:  I reviewed her interval history, imaging, imaging reports and examination together today.    Her imaging and examination are in good order today.      We previously discussed that the JAMES mutation that she carries harbors a lifetime risk of 17-52%, but likely closer to the 52% end ofthe spectrum due to the exact mutation.  We discussed previously surgical risk reduction strategies as well as pharmacologic risk reduction and enhanced imaging surveillance.  She preferred to proceed with the latter.  A copy of ASK2ME assessment was given and reviewed for her risk of breast and other cancers.    She does  have surveillance with her dermatologist for the atypical nevus for annual skin screening.    She does not see another provider at a 6 month interval for CBE and is interested in obtaining this here.  We will also alternate her MRI and screening mammogram so she receives those studies at 6 month intervals.      I will arrange for her next MRI for 6/2021 at [facility] followed by clinic visit with me.    I've asked her to continue her self breast exam monthly and to call us in the interim with concerns and we'd be happy to see her back sooner.    She has been instructed to call Dr. Pappas once she is closer to the postmenopausal period to reconsider aromatase inhibitor as risk reducing intervention.    MILI Wynn  We spent 15  minutes in visit today, 10 in face to face .    Next Appointment:  To be scheduled in 6/2021 after MRI.      EMR Dragon/transcription disclaimer:    Much of this encounter note is an electronic transcription/translocation of spoken language to printed text.  The electronic translation of spoken language may permit erroneous, or at times, nonsensical words or phrases to be inadvertently transcribed.  Although I have reviewed the note from such areas, some may still exist.

## 2021-01-25 ENCOUNTER — OFFICE VISIT (OUTPATIENT)
Dept: SURGERY | Facility: CLINIC | Age: 46
End: 2021-01-25

## 2021-01-25 VITALS
BODY MASS INDEX: 29.63 KG/M2 | TEMPERATURE: 98.4 F | HEART RATE: 77 BPM | DIASTOLIC BLOOD PRESSURE: 80 MMHG | HEIGHT: 62 IN | SYSTOLIC BLOOD PRESSURE: 130 MMHG | WEIGHT: 161 LBS | OXYGEN SATURATION: 99 %

## 2021-01-25 DIAGNOSIS — Z15.01 MONOALLELIC MUTATION OF ATM GENE: ICD-10-CM

## 2021-01-25 DIAGNOSIS — Z15.09 MONOALLELIC MUTATION OF ATM GENE: ICD-10-CM

## 2021-01-25 DIAGNOSIS — Z91.89 INCREASED RISK OF BREAST CANCER: ICD-10-CM

## 2021-01-25 DIAGNOSIS — Z80.3 FH: BREAST CANCER: Primary | ICD-10-CM

## 2021-01-25 DIAGNOSIS — N60.12 FIBROCYSTIC BREAST CHANGES, BILATERAL: ICD-10-CM

## 2021-01-25 DIAGNOSIS — Z15.89 MONOALLELIC MUTATION OF ATM GENE: ICD-10-CM

## 2021-01-25 DIAGNOSIS — N60.11 FIBROCYSTIC BREAST CHANGES, BILATERAL: ICD-10-CM

## 2021-01-25 PROCEDURE — 99213 OFFICE O/P EST LOW 20 MIN: CPT | Performed by: NURSE PRACTITIONER

## 2021-01-26 ENCOUNTER — TELEPHONE (OUTPATIENT)
Dept: SURGERY | Facility: CLINIC | Age: 46
End: 2021-01-26

## 2021-01-26 NOTE — TELEPHONE ENCOUNTER
Scheduled Bilateral Breast MRI w wo contrast at [facility]  6-30-21  Arrive 11:15    Return to see Anais Mcgovern NP 7-12-21 arrive 12:15    LM on patient's phone,  Starla

## 2021-06-30 ENCOUNTER — HOSPITAL ENCOUNTER (OUTPATIENT)
Dept: MRI IMAGING | Facility: HOSPITAL | Age: 46
Discharge: HOME | End: 2021-06-30
Admitting: NURSE PRACTITIONER

## 2021-06-30 DIAGNOSIS — Z15.89 MONOALLELIC MUTATION OF ATM GENE: ICD-10-CM

## 2021-06-30 DIAGNOSIS — N60.12 FIBROCYSTIC BREAST CHANGES, BILATERAL: ICD-10-CM

## 2021-06-30 DIAGNOSIS — Z15.01 MONOALLELIC MUTATION OF ATM GENE: ICD-10-CM

## 2021-06-30 DIAGNOSIS — Z15.09 MONOALLELIC MUTATION OF ATM GENE: ICD-10-CM

## 2021-06-30 DIAGNOSIS — Z91.89 INCREASED RISK OF BREAST CANCER: ICD-10-CM

## 2021-06-30 DIAGNOSIS — Z80.3 FH: BREAST CANCER: ICD-10-CM

## 2021-06-30 DIAGNOSIS — N60.11 FIBROCYSTIC BREAST CHANGES, BILATERAL: ICD-10-CM

## 2021-06-30 PROCEDURE — 77049 MRI BREAST C-+ W/CAD BI: CPT

## 2021-06-30 PROCEDURE — 0 GADOBENATE DIMEGLUMINE 529 MG/ML SOLUTION: Performed by: NURSE PRACTITIONER

## 2021-06-30 PROCEDURE — 82565 ASSAY OF CREATININE: CPT

## 2021-06-30 PROCEDURE — A9577 INJ MULTIHANCE: HCPCS | Performed by: NURSE PRACTITIONER

## 2021-06-30 RX ADMIN — GADOBENATE DIMEGLUMINE 14 ML: 529 INJECTION, SOLUTION INTRAVENOUS at 12:22

## 2021-07-01 LAB — CREAT BLDA-MCNC: 0.7 MG/DL (ref 0.6–1.3)

## 2021-07-12 ENCOUNTER — OFFICE VISIT (OUTPATIENT)
Dept: SURGERY | Facility: CLINIC | Age: 46
End: 2021-07-12

## 2021-07-12 VITALS
HEIGHT: 62 IN | HEART RATE: 69 BPM | BODY MASS INDEX: 27.6 KG/M2 | WEIGHT: 150 LBS | SYSTOLIC BLOOD PRESSURE: 138 MMHG | DIASTOLIC BLOOD PRESSURE: 87 MMHG | OXYGEN SATURATION: 98 %

## 2021-07-12 DIAGNOSIS — Z15.01 MONOALLELIC MUTATION OF ATM GENE: ICD-10-CM

## 2021-07-12 DIAGNOSIS — Z91.89 INCREASED RISK OF BREAST CANCER: ICD-10-CM

## 2021-07-12 DIAGNOSIS — N60.11 FIBROCYSTIC BREAST CHANGES, BILATERAL: Primary | ICD-10-CM

## 2021-07-12 DIAGNOSIS — Z15.09 MONOALLELIC MUTATION OF ATM GENE: ICD-10-CM

## 2021-07-12 DIAGNOSIS — Z15.89 MONOALLELIC MUTATION OF ATM GENE: ICD-10-CM

## 2021-07-12 DIAGNOSIS — Z80.3 FH: BREAST CANCER: ICD-10-CM

## 2021-07-12 DIAGNOSIS — N60.12 FIBROCYSTIC BREAST CHANGES, BILATERAL: Primary | ICD-10-CM

## 2021-07-12 PROCEDURE — 99214 OFFICE O/P EST MOD 30 MIN: CPT | Performed by: NURSE PRACTITIONER

## 2021-07-12 RX ORDER — METHOTREXATE 2.5 MG/1
2.5 TABLET ORAL WEEKLY
COMMUNITY
Start: 2021-05-12

## 2021-07-12 RX ORDER — DESVENLAFAXINE 100 MG/1
100 TABLET, EXTENDED RELEASE ORAL DAILY
COMMUNITY
Start: 2021-05-18

## 2021-07-12 NOTE — PROGRESS NOTES
Chief Complaint: Yanni Arauz is a 45 y.o. 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   Her mother had breast cancer diagnosed at age 49, so Dr Fajardo sent a My Risk panel, which retured as positive for an JAMES mutation.      She noted no masses, skin changes, nipple discharge, nipple changes prior to her most recent imaging.   Her most recent imaging includes:  10/29/12        [facility]         Mammo Screening Bilateral            Yanni Arauz.  The breasts are heterogenously dense.  Ill-defined subcentimeter opacities are seen in the mid medial left breast on the CC view, and in the mid inferior right breast on the MLO view.  IMPRESSION:  Birads 0.    11/13/12        [facility]         Mammo Diagnostic Bilateral            Yanni Arauz.  The previously noted asymmetries int he medial left breast and inferior right breast do not persist on the additional views.   Birads 2.     10/30/13       [facility]         Mammo Screening Bilateral w/CAD           Yanni Arauz.  Birads 2.    11/20/14        [facility]         Digital Screening          Yanni Arauz.  Birads 2 Benign.  Breast Composition: Heterogeneously dense.      We then arranged for her to have a breast MRI, as follows:    08/11/15      [facility]         Breast Bilateral MRI          [place] Y[place] MAX.  Birads 1 Negative.    She has her uterus and ovaries, is premenopausal, and takes no hormones.  Her family history includes the following: Shehas one daughter, one son, one sister, 2 MA, 1 MU, 4 PA, 3 PU. Her mother had breast cancer diagnosed at age 49, alive in 2015.  No other breast and no ovarian cancer in her family.    1/12/2017:  In the interim,  Yanni Arauz  has done well.  She has noted no changes in her breast exam. No new masses, skin changes, nipple changes,  nipple discharge either breast.   She denies headache, bone pain, belly pain, cough, changes in vision or gait.  Her most recent imaging includes the following:  [facility] November 29, 2016 screening mammogram.  Heterogenous a dense.  BI-RADS 1 screening MRI, no  evidence of malignancy.    1/9/2018:  In the interim,  Yanni Arauz  has done well.  Her punch biopsy returned as a nevus with mild cytologic atypia.  She has seen Dr. Vaca her dermatology in follow-up about this.  Medical oncology note dated August 29, 2017 Dr. Everardo Pappas.  Agree with annual MRI and mammogram.  At this time she would like to not pursue additional prophylactic antiestrogen.  There is no information available at this time demonstrating the efficacy of concerns or aromatase inhibitors with this specific mutation.  With a long-term strategy of risk reduction, I suggested we consider tamoxifen again in the future.  Also, at the time of menopause 12 aromatase inhibitors would be appropriate.  Follow-up with me as needed.  We agree on a routine visit in 1-2 years.    She has noted no changes in her breast exam. No new masses, skin changes, nipple changes, nipple discharge either breast.   She denies headache, bone pain, belly pain, cough, changes in vision or gait.  Her most recent imaging includes the following:  [facility] December 7, 2017 bilateral screening mammogram with 3-D.  Heterogenous leg dense breast tissue.  BI-RADS 1.  December 7, 2017, [facility] , bilateral breast MRI:  There is a 5 mm focus of suspicious enhancement in the lower outer left breast approximate 4 cm from the nipple axillary lymph nodes are normal in appearance.  Physician directed ultrasound could be performed chromic recommend either ultrasound guided or MRI directed biopsy.    Left breast ultrasound: In the 4 o'clock position of the left breast there is a focus of duct ectasia measuring 5 mm.  In the 3:00 location there is  a 5.6 mm probable internal mammary lymph node.  Conclusion the duct ectasia has a benign appearance.  It is possible that the benign appearing intramammary lymph node could represent a suspicious focus of enhancement on the recent MRI.  So would recommend MRI guided biopsy: BI-RADS IVb.     January 2, 2018 MRI guided left breast biopsy [facility]: 10-gauge Encore multiple core biopsies.  M-shaped marker placed.  Pathology returned as benign breast tissue with columnar cell change, apically metaplasia, and mild stromal fibrosis.  She tells me that she had some bruising and some drainage of the hematoma from her mammotomy.  Denies any erythema or warmth.    7/31/2018:  In the interim,  Yanni Arauz  has done well. SHe called due to persistent pain at the LEFt MRI guided bipsy site done at [facility] 1-2-18.  She tells me that the discomfort is present and has been bothering her more lately and thought shed get it checked.   She did have a sizeable hematoma, with blood leaking from mammotomy at the time of her procedure.    She has not taken any medication for this.  She has noted no other changes in her breast exam. No new masses, skin changes, nipple changes, nipple discharge either breast.     1/29/2019:  In the interim Yanni had a right breast MRI guided biopsy 4 and abnormality seen on her most recent screening MRI from December 2018.    January 24, 2019, MRI guided right breast biopsy [facility]: Right breast 12:00, 8-gauge device, multiple specimens, a post biopsy MRI imaging demonstrates adequate sampling.  Post biopsy mammogram shows clip in the place of the area of concern.  The pathology is concordant with the imaging appearance.  Routine MRI follow-up is recommended.  Pathology returned as focal hyalinized fibrous mastopathy.  Duct ectasia with cyst formation.  No significant intraductal hyperplasia or atypia.    1/21/2021:  She was last seen in clinic by Dr Mosqueda in 1/2020:  In  the interim,  Yanni Arauz  has done well.  She has noted no changes in her breast exam. No new masses, skin changes, nipple changes, nipple discharge either breast.   Her most recent imaging includes the following:  [facility] bilateral screening mammogram with 3D December 27, 2019: Scattered fibroglandular density.  BI-RADS 1  [facility] bilateral breast MRI December 27, 2019- bilateral breast MRI with and without contrast BI-RADS 1    She is seen Dr. Pappas and they have decided to proceed with no additional antiestrogen therapy at this time.    1/25/2021:  No breast concerns, no health issues.  She completed her screening mammogram and breast MRI in 12/2020.  She did have covid but has recovered.  [facility] bilateral screening mammogram with 3D December 28, 2020: Scattered fibroglandular density.  BI-RADS 1  [facility] bilateral breast MRI December 28, 2020- bilateral breast MRI with and without contrast BI-RADS 2     7/12/2021, Interval History:  She returns today for follow up with no breast complaints or health issues.    Breast MRI was completed 6/30/2021 with stable findings, BiRads 2 ( see full report below)      Review of Systems:  Review of Systems   Constitutional: Negative for unexpected weight change (3 LB WT LOSS).   All other systems reviewed and are negative.       Past Medical and Surgical History:  Breast Biopsy History:   January 2, 2018 MRI guided breast biopsy   January 24, 2019, MRI guided breast biopsy      Breast Cancer HIstory:  Patient does not have a past medical history of breast cancer.  Breast Operations, and year:  None   Obstetric/Gynecologic History:  Age menstrual periods began: 13  Patient is premenopausal, first day of last period: 11/8-13th  Number of pregnancies: 2  Number of live births: 2  Number of abortions or miscarriages: 0  Age of delivery of first child: 28  Patient did not breast feed.  Length of time  "taking birth control pills: since 17 on and off  Patient has never taken hormone replacement    Past Surgical History:   Procedure Laterality Date   • BREAST BIOPSY Left 2018    Benign   • KNEE ARTHROPLASTY     • RIGHT OOPHORECTOMY      DUE TO ENDOMETRIOSIS   • WRIST FRACTURE SURGERY       Past Medical History:   Diagnosis Date   • Anxiety    • Fibrocystic breast changes, bilateral 1/25/2021   • Rheumatoid arthritis (CMS/HCC)     MICHELLEENIRAJESH, AGE 6       Prior Hospitalizations, other than for surgery or childbirth, and year:  None     Social History     Socioeconomic History   • Marital status:      Spouse name: Not on file   • Number of children: Not on file   • Years of education: Not on file   • Highest education level: Not on file   Tobacco Use   • Smoking status: Never Smoker   Substance and Sexual Activity   • Alcohol use: Yes     Alcohol/week: 3.0 standard drinks     Types: 3 Glasses of wine per week   • Drug use: No     Patient is .  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    Family History:  Family History   Problem Relation Age of Onset   • Hypertension Mother    • Hyperlipidemia Mother    • Breast cancer Mother 49   • Hypertension Father    • Hyperlipidemia Father    • Heart failure Father    • Cancer Father 71        COLORECTAL       Vital Signs:  /87 (BP Location: Right arm)   Pulse 69   Ht 157.5 cm (62\")   Wt 68 kg (150 lb)   SpO2 98%   BMI 27.44 kg/m²      Medications:    Current Outpatient Medications:   •  desvenlafaxine (PRISTIQ) 100 MG 24 hr tablet, Take 100 mg by mouth Daily., Disp: , Rfl:   •  Etanercept 50 MG/ML solution cartridge, Inject 50 mg under the skin into the appropriate area as directed., Disp: , Rfl:   •  folic acid (FOLVITE) 1 MG tablet, Take 1 mg by mouth Daily., Disp: , Rfl:   •  hydroxychloroquine (PLAQUENIL) 200 MG tablet, Take  by mouth Daily., " "Disp: , Rfl:   •  lisinopril (PRINIVIL,ZESTRIL) 10 MG tablet, , Disp: , Rfl:   •  methotrexate 2.5 MG tablet, , Disp: , Rfl:      Allergies:  No Known Allergies    Physical Examination:  /87 (BP Location: Right arm)   Pulse 69   Ht 157.5 cm (62\")   Wt 68 kg (150 lb)   SpO2 98%   BMI 27.44 kg/m²   General Appearance:  Patient is in no distress.  She is well kept and has a  average  build.   Psychiatric:  Patient with appropriate mood and affect. Alert and oriented to self, time, and place.    Breast, RIGHT:  small sized, symmetric with the contralateral side.  Breast skin is without erythema, edema, rashes.  There are no visible abnormalities upon inspection during the arm-raising maneuver or with hands on hips in the sitting position. There is no nipple retraction, discharge or nipple/areolar skin changes.There are no masses palpable in the sitting or supine positions.     Breast, LEFT:   small sized, symmetric with the contralateral side.  Breast skin is without erythema, edema, rashes.  There are no visible abnormalities upon inspection during the arm-raising maneuver or with hands on hips in the sitting position. There is no nipple retraction, discharge or nipple/areolar skin changes.There are no masses palpable in the sitting or supine positions.     Lymphatic:  There is no axillary, cervical, infraclavicular, or supraclavicular adenopathy bilaterally.  Musculoskeletal:  Good strength in all 4 extremities.  There is good range of motion in both shoulders.   Skin:  No new skin lesions or rashes on the skin excluding the breast (see breast exam above).    Imagin2021:  Breast MRI at [facility]  FINDINGS: There is heterogeneous fibroglandular tissue. There is marked background parenchymal enhancement, which limits the sensitivity for detection of invasive malignancy and DCIS.   RIGHT BREAST:    No suspicious enhancing mass or area of non-mass enhancement is identified.  The visualized axilla " is within normal limits.    LEFT BREAST:    No suspicious enhancing mass or area of non-mass enhancement is identified.  The visualized axilla is within normal limits.    EXTRAMAMMARY FINDINGS:   There are no abnormally enlarged internal mammary chain lymph nodes on either side.     There is a 1.1 cm STIR hyperintense lesion in the left hepatic lobe, which previously measured 1.3 cm when remeasured in 2018.     IMPRESSION AND RECOMMENDATION: No MRI evidence of malignancy in either breast. Recommend annual screening mammogram in December 2021 and screening breast MRI in one year.   BI-RADS Category 1: Negative      Pathology:  Pathology from punch biopsy returned as nevus with mild cytologic atypia.     1-16-17  [facility]  PATHOLOGY  JUAN LUIS HANEY  Final Diagnosis   LEFT BREAST BIOPSY (SKIN):  SKIN WITH COMPOUND NEVUS WITH MILD CYTOLOGIC ATYPIA.   MARGIN FREE.       [facility]/a/[facility]  [facility]/juan pablo      MRI guided breast biopsy [facility] January 24, 2019: Right breast 12:00, 8-gauge device, multiple specimens, a post biopsy MRI imaging demonstrates adequate sampling.  Post biopsy mammogram shows clip in the place of the area of concern.  The pathology is concordant with the imaging appearance.  Routine MRI follow-up is recommended.  Pathology returned as focal hyalinized fibrous mastopathy.  Duct ectasia with cyst formation.  No significant intraductal hyperplasia or atypia.    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   Seeing her back in approximately 6 months.     7-28-16                         [facility] MED ONC                           JUAN LUIS CROUCH MD     PLAN:  She is tolerating tamoxifen well with no adverse  events or complications. I will plan on seeing her back in approximately 6 months.  Medical oncology note dated August 29, 2017 Dr. Everardo Pappas.  Agree with annual MRI and mammogram.  At this time she would like to not pursue additional prophylactic antiestrogen.  There is no information available at this time demonstrating the efficacy of concerns or aromatase inhibitors with this specific mutation.  With a long-term strategy of risk reduction, I suggested we consider tamoxifen again in the future.  Also, at the time of menopause 12 aromatase inhibitors would be appropriate.  Follow-up with me as needed.  We agree on a routine visit in 1-2 years.    [facility] note dated January 2, 2018 MRI guided breast biopsy: 10-gauge Encore multiple core biopsies.  M-shaped marker placed.  Addendum will be provided when the pathology is available.    Procedures:  Punch biopsy skin lesion 1-12-17:  Indication:  skin lesion of indeterminate significance  Location: LEFT breast UOQ,  inner ring  Consent:  The risks, benefits, and alternatives to the procedure were discussed with the patient, who understood and wished to proceed.  The risks described included, but were not limited to, bleeding and infection.  Description of Procedure:   After the patient was positioned supine on the procedure table, I prepped and draped the affected breast skin in sterile fashion.  I anesthetized the affected skin and subcutaneous tissue with 1% lidocaine with epinephrine.  I then I then took a 8mm punch biopsy of the affected skin.   Manual compression was held for 5 minutes, 3x 4-0 nylon sutures were placed to approximate the skin edges. Antibacterial ointment  and a sterile dressing were applied.  Tolerance: The patient tolerated the procedure well.  Disposition: We will see her back within a week to discuss her pathology.    Assessment:    1. Benign breast biopsy, right 1/2019, left 1/2018  --RIGHT central- seen on CC only on  mammogram , MRI correlate of enhancing focus- BR4- no papable finding  MRI guided breast biopsy [facility] January 24, 2019:-focal hyalinized fibrous mastopathy.  Duct ectasia with cyst formation.  No significant intraductal hyperplasia or atypia. Concordant    --MRI in December 2017 [facility] lower outer quadrant 5 mm area of enhancement, no ultrasound correlate.  MRI guided biopsy showed benign breast with columnar cell change apically and metaplasia and stromal fibrosis.      2-  Left breast skin biopsy  LEFT breast UOQ, inner ring, 5mm hyperpigmented lesion with some border irregularity and heterogeneity of color   - removed with punch 1-11-17  - [facility]    3  Family history of breast cancer, mother age 49    4- Positive for mutation in  JAMES: c.7271T>G- diagnosed via MyRisk from Dr Fajardo, lifetime risk of 17-52%  Dr Erwin Tamoxifen since 1-2016- saw Dr Pappas- stopped this 2017.  Consider an aromatase inhibitor once postmenopausal.  -Annual mammography and MRI.      Plan:  I reviewed her interval history, imaging, imaging reports and examination together today.    Her imaging and examination are in good order today.    We previously discussed that the JAMES mutation that she carries harbors a lifetime risk of 17-52%, but likely closer to the 52% end ofthe spectrum due to the exact mutation.  We discussed previously surgical risk reduction strategies as well as pharmacologic risk reduction and enhanced imaging surveillance.  She preferred to proceed with the latter.  A copy of ASK2ME assessment was given and reviewed for her risk of breast and other cancers.    She does have surveillance with her dermatologist for the atypical nevus for annual skin screening.    She does not see another provider at a 6 month interval for CBE and is interested in obtaining this here.  We will also alternate her MRI and screening mammogram so she receives those  studies at 6 month intervals.      -screening mammogram with tomosynthesis in 12/2021 at [facility], followed by exam    I've asked her to continue her self breast exam monthly and to call us in the interim with concerns and we'd be happy to see her back sooner.    She has been instructed to call Dr. Pappas once she is closer to the postmenopausal period to reconsider aromatase inhibitor as risk reducing intervention.    MILI Wynn/transcription disclaimer:  Dictated using Dragon dictation

## 2021-07-16 ENCOUNTER — TELEPHONE (OUTPATIENT)
Dept: MAMMOGRAPHY | Facility: CLINIC | Age: 46
End: 2021-07-16

## 2021-07-22 NOTE — PRE-PROCEDURE INSTRUCTIONS
INSTRUCTED ON LAXATIVE PREP-PRE OP MEDS-CLEAR LIQUID DIET-SMALL SIP OF WATER WITH MEDS    MEDS TO TAKE    PRISTIQ  PLAQUENIL

## 2021-07-23 ENCOUNTER — HOSPITAL ENCOUNTER (OUTPATIENT)
Facility: HOSPITAL | Age: 46
Setting detail: HOSPITAL OUTPATIENT SURGERY
Discharge: HOME | End: 2021-07-23
Attending: INTERNAL MEDICINE | Admitting: INTERNAL MEDICINE

## 2021-07-23 ENCOUNTER — ANESTHESIA EVENT (OUTPATIENT)
Dept: GASTROENTEROLOGY | Facility: HOSPITAL | Age: 46
End: 2021-07-23

## 2021-07-23 ENCOUNTER — ANESTHESIA (OUTPATIENT)
Dept: GASTROENTEROLOGY | Facility: HOSPITAL | Age: 46
End: 2021-07-23

## 2021-07-23 VITALS
SYSTOLIC BLOOD PRESSURE: 150 MMHG | HEART RATE: 67 BPM | BODY MASS INDEX: 27.18 KG/M2 | DIASTOLIC BLOOD PRESSURE: 86 MMHG | WEIGHT: 147.71 LBS | TEMPERATURE: 98.4 F | RESPIRATION RATE: 18 BRPM | OXYGEN SATURATION: 100 % | HEIGHT: 62 IN

## 2021-07-23 LAB — B-HCG UR QL: NEGATIVE

## 2021-07-23 PROCEDURE — 81025 URINE PREGNANCY TEST: CPT | Performed by: ANESTHESIOLOGY

## 2021-07-23 PROCEDURE — 45378 DIAGNOSTIC COLONOSCOPY: CPT | Performed by: INTERNAL MEDICINE

## 2021-07-23 PROCEDURE — 25010000002 PROPOFOL 10 MG/ML EMULSION: Performed by: NURSE ANESTHETIST, CERTIFIED REGISTERED

## 2021-07-23 RX ORDER — SODIUM CHLORIDE, SODIUM LACTATE, POTASSIUM CHLORIDE, CALCIUM CHLORIDE 600; 310; 30; 20 MG/100ML; MG/100ML; MG/100ML; MG/100ML
30 INJECTION, SOLUTION INTRAVENOUS CONTINUOUS
Status: DISCONTINUED | OUTPATIENT
Start: 2021-07-23 | End: 2021-07-23 | Stop reason: HOSPADM

## 2021-07-23 RX ORDER — LIDOCAINE HYDROCHLORIDE 20 MG/ML
INJECTION, SOLUTION INFILTRATION; PERINEURAL AS NEEDED
Status: DISCONTINUED | OUTPATIENT
Start: 2021-07-23 | End: 2021-07-23 | Stop reason: SURG

## 2021-07-23 RX ORDER — PROPOFOL 10 MG/ML
VIAL (ML) INTRAVENOUS AS NEEDED
Status: DISCONTINUED | OUTPATIENT
Start: 2021-07-23 | End: 2021-07-23 | Stop reason: SURG

## 2021-07-23 RX ADMIN — PROPOFOL 125 MCG/KG/MIN: 10 INJECTION, EMULSION INTRAVENOUS at 08:05

## 2021-07-23 RX ADMIN — PROPOFOL 100 MG: 10 INJECTION, EMULSION INTRAVENOUS at 08:29

## 2021-07-23 RX ADMIN — SODIUM CHLORIDE, POTASSIUM CHLORIDE, SODIUM LACTATE AND CALCIUM CHLORIDE 30 ML/HR: 600; 310; 30; 20 INJECTION, SOLUTION INTRAVENOUS at 07:26

## 2021-07-23 RX ADMIN — LIDOCAINE HYDROCHLORIDE 100 MG: 20 INJECTION, SOLUTION INFILTRATION; PERINEURAL at 08:05

## 2021-07-23 RX ADMIN — PROPOFOL 50 MG: 10 INJECTION, EMULSION INTRAVENOUS at 08:17

## 2021-07-23 RX ADMIN — PROPOFOL 50 MG: 10 INJECTION, EMULSION INTRAVENOUS at 08:26

## 2021-07-23 NOTE — H&P
Pre Procedure History & Physical    Chief Complaint:   Screening    Subjective     HPI:   Father with colon cancer age 70    Past Medical History:   Past Medical History:   Diagnosis Date   • Anxiety    • Depression    • Endometriosis    • Fibrocystic breast changes, bilateral 1/25/2021   • Rheumatoid arthritis (CMS/HCC)     MICHELLEENIRAJESH, AGE 6       Past Surgical History:  Past Surgical History:   Procedure Laterality Date   • BREAST BIOPSY Left 2018    Benign   • COLONOSCOPY  2003    [place]   • KNEE ARTHROPLASTY Right    • RIGHT OOPHORECTOMY      DUE TO ENDOMETRIOSIS   • WRIST FRACTURE SURGERY Left        Family History:  Family History   Problem Relation Age of Onset   • Hypertension Mother    • Hyperlipidemia Mother    • Breast cancer Mother 49   • Hypertension Father    • Hyperlipidemia Father    • Heart failure Father    • Cancer Father 71        COLORECTAL   • Colon cancer Father 70   • Malig Hyperthermia Neg Hx        Social History:   reports that she has never smoked. She has never used smokeless tobacco. She reports current alcohol use. She reports that she does not use drugs.    Medications:   Medications Prior to Admission   Medication Sig Dispense Refill Last Dose   • desvenlafaxine (PRISTIQ) 100 MG 24 hr tablet Take 100 mg by mouth Daily.   7/22/2021 at Unknown time   • Etanercept 50 MG/ML solution cartridge Inject 50 mg under the skin into the appropriate area as directed 1 (One) Time Per Week.   Past Week at Unknown time   • folic acid (FOLVITE) 1 MG tablet Take 1 mg by mouth Daily.   7/22/2021 at Unknown time   • hydroxychloroquine (PLAQUENIL) 200 MG tablet Take 200 mg by mouth 2 (Two) Times a Day.   7/22/2021 at Unknown time   • lisinopril (PRINIVIL,ZESTRIL) 10 MG tablet Take 10 mg by mouth Daily.   7/22/2021 at Unknown time   • methotrexate 2.5 MG tablet Take 2.5 mg by mouth 1 (One) Time Per Week. 25 MG ON MONDAY   Past Week at Unknown time       Allergies:  Patient has no known  "allergies.        Objective     Blood pressure 131/84, pulse 70, temperature 98 °F (36.7 °C), temperature source Temporal, resp. rate 16, height 157.5 cm (62.01\"), weight 67 kg (147 lb 11.3 oz), SpO2 97 %, not currently breastfeeding.    Physical Exam   Constitutional: Pt is oriented to person, place, and time and well-developed, well-nourished, and in no distress.   Mouth/Throat: Oropharynx is clear and moist.   Neck: Normal range of motion.   Cardiovascular: Normal rate, regular rhythm and normal heart sounds.    Pulmonary/Chest: Effort normal and breath sounds normal.   Abdominal: Soft. Nontender  Skin: Skin is warm and dry.   Psychiatric: Mood, memory, affect and judgment normal.     Assessment/Plan     Diagnosis:  High risk screening    Anticipated Surgical Procedure:  Colonoscopy    The risks, benefits, and alternatives of this procedure have been discussed with the patient or the responsible party- the patient understands and agrees to proceed.            "

## 2021-07-23 NOTE — ANESTHESIA POSTPROCEDURE EVALUATION
Patient: Yanni Arauz    Procedure Summary     Date: 07/23/21 Room / Location: [facility] ENDOSCOPY 4 / [facility] END[facility]COPY    Anesthesia Start: 0800 Anesthesia Stop: 0844    Procedure: COLONOSCOPY (N/A ) Diagnosis: (fam hx)    Surgeons: Joey Jackson MD Provider: Yosef Fritz MD    Anesthesia Type: general, MAC ASA Status: 1          Anesthesia Type: general, MAC    Vitals  Vitals Value Taken Time   /90 07/23/21 0845   Temp 37.1 °C (98.8 °F) 07/23/21 0840   Pulse 71 07/23/21 0845   Resp 18 07/23/21 0845   SpO2 100 % 07/23/21 0845           Post Anesthesia Care and Evaluation    Patient location during evaluation: bedside  Patient participation: complete - patient participated  Level of consciousness: awake  Pain score: 0  Pain management: adequate  Airway patency: patent  Anesthetic complications: No anesthetic complications  PONV Status: none  Cardiovascular status: acceptable and stable  Respiratory status: acceptable and room air  Hydration status: acceptable    Comments: An Anesthesiologist personally participated in the most demanding procedures (including induction and emergence if applicable) in the anesthesia plan, monitored the course of anesthesia administration at frequent intervals and remained physically present and available for immediate diagnosis and treatment of emergencies.

## 2021-08-06 ENCOUNTER — TELEPHONE (OUTPATIENT)
Dept: GASTROENTEROLOGY | Facility: CLINIC | Age: 46
End: 2021-08-06

## 2021-12-27 ENCOUNTER — HOSPITAL ENCOUNTER (OUTPATIENT)
Dept: MAMMOGRAPHY | Facility: HOSPITAL | Age: 46
Discharge: HOME | End: 2021-12-27
Admitting: NURSE PRACTITIONER

## 2021-12-27 DIAGNOSIS — N60.11 FIBROCYSTIC BREAST CHANGES, BILATERAL: ICD-10-CM

## 2021-12-27 DIAGNOSIS — Z15.01 MONOALLELIC MUTATION OF ATM GENE: ICD-10-CM

## 2021-12-27 DIAGNOSIS — Z80.3 FH: BREAST CANCER: ICD-10-CM

## 2021-12-27 DIAGNOSIS — Z91.89 INCREASED RISK OF BREAST CANCER: ICD-10-CM

## 2021-12-27 DIAGNOSIS — N60.12 FIBROCYSTIC BREAST CHANGES, BILATERAL: ICD-10-CM

## 2021-12-27 DIAGNOSIS — Z15.09 MONOALLELIC MUTATION OF ATM GENE: ICD-10-CM

## 2021-12-27 DIAGNOSIS — Z15.89 MONOALLELIC MUTATION OF ATM GENE: ICD-10-CM

## 2021-12-27 PROCEDURE — 77067 SCR MAMMO BI INCL CAD: CPT

## 2021-12-27 PROCEDURE — 77063 BREAST TOMOSYNTHESIS BI: CPT

## 2021-12-28 ENCOUNTER — TELEPHONE (OUTPATIENT)
Dept: SURGERY | Facility: CLINIC | Age: 46
End: 2021-12-28

## 2021-12-28 DIAGNOSIS — R92.8 ABNORMAL FINDING ON BREAST IMAGING: Primary | ICD-10-CM

## 2021-12-28 NOTE — TELEPHONE ENCOUNTER
Left diag. [facility] and US 1/26/22 @ [facility]. Patient is aware.     ----- Message from MILI Wynn sent at 12/28/2021  1:28 PM EST -----  Please let patient know that she needs left diagnostic mammogram and left US in near future for further evaluation of findings seen on screening mammogram.  I will put in orders if you can schedule prior to clinic visit on 1/18/22 if possible. thanks

## 2021-12-28 NOTE — PROGRESS NOTES
Please let patient know that she needs left diagnostic mammogram and left US in near future for further evaluation of findings seen on screening mammogram.  I will put in orders if you can schedule prior to clinic visit on 1/18/22 if possible. thanks

## 2022-01-18 ENCOUNTER — OFFICE VISIT (OUTPATIENT)
Dept: SURGERY | Facility: CLINIC | Age: 47
End: 2022-01-18

## 2022-01-18 VITALS
OXYGEN SATURATION: 99 % | HEART RATE: 65 BPM | WEIGHT: 147 LBS | HEIGHT: 62 IN | SYSTOLIC BLOOD PRESSURE: 147 MMHG | BODY MASS INDEX: 27.05 KG/M2 | DIASTOLIC BLOOD PRESSURE: 86 MMHG

## 2022-01-18 DIAGNOSIS — Z80.3 FH: BREAST CANCER: ICD-10-CM

## 2022-01-18 DIAGNOSIS — R92.8 ABNORMAL FINDING ON BREAST IMAGING: Primary | ICD-10-CM

## 2022-01-18 DIAGNOSIS — Z15.01 MONOALLELIC MUTATION OF ATM GENE: ICD-10-CM

## 2022-01-18 DIAGNOSIS — Z15.09 MONOALLELIC MUTATION OF ATM GENE: ICD-10-CM

## 2022-01-18 DIAGNOSIS — N60.11 FIBROCYSTIC BREAST CHANGES, BILATERAL: ICD-10-CM

## 2022-01-18 DIAGNOSIS — N60.12 FIBROCYSTIC BREAST CHANGES, BILATERAL: ICD-10-CM

## 2022-01-18 DIAGNOSIS — Z91.89 INCREASED RISK OF BREAST CANCER: ICD-10-CM

## 2022-01-18 DIAGNOSIS — Z15.89 MONOALLELIC MUTATION OF ATM GENE: ICD-10-CM

## 2022-01-18 PROCEDURE — 99213 OFFICE O/P EST LOW 20 MIN: CPT | Performed by: NURSE PRACTITIONER

## 2022-01-18 RX ORDER — ADALIMUMAB 40MG/0.4ML
40 KIT SUBCUTANEOUS
COMMUNITY
Start: 2022-01-07 | End: 2022-09-15 | Stop reason: CLARIF

## 2022-01-18 RX ORDER — PREDNISONE 1 MG/1
TABLET ORAL
COMMUNITY
Start: 2022-01-03

## 2022-01-18 RX ORDER — AZELAIC ACID 0.15 G/G
GEL TOPICAL
COMMUNITY
Start: 2021-10-11 | End: 2022-09-15

## 2022-01-18 NOTE — PROGRESS NOTES
Chief Complaint: Yanni Arauz is a 46 y.o. 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   Her mother had breast cancer diagnosed at age 49, so Dr Fajardo sent a My Risk panel, which retured as positive for an JAMES mutation.      She noted no masses, skin changes, nipple discharge, nipple changes prior to her most recent imaging.   Her most recent imaging includes:  10/29/12        [facility]         Mammo Screening Bilateral            Yanni Arauz.  The breasts are heterogenously dense.  Ill-defined subcentimeter opacities are seen in the mid medial left breast on the CC view, and in the mid inferior right breast on the MLO view.  IMPRESSION:  Birads 0.    11/13/12        [facility]         Mammo Diagnostic Bilateral            Yanni Arauz.  The previously noted asymmetries int he medial left breast and inferior right breast do not persist on the additional views.   Birads 2.     10/30/13       [facility]         Mammo Screening Bilateral w/CAD           Yanni Arauz.  Birads 2.    11/20/14        [facility]         Digital Screening          Yanni Arauz.  Birads 2 Benign.  Breast Composition: Heterogeneously dense.      We then arranged for her to have a breast MRI, as follows:    08/11/15      [facility]         Breast Bilateral MRI          Davonte Yanni MAX.  Birads 1 Negative.    She has her uterus and ovaries, is premenopausal, and takes no hormones.  Her family history includes the following: Shehas one daughter, one son, one sister, 2 MA, 1 MU, 4 PA, 3 PU. Her mother had breast cancer diagnosed at age 49, alive in 2015.  No other breast and no ovarian cancer in her family.    1/12/2017:  In the interim,  Yanni Arauz  has done well.  She has noted no changes in her breast exam. No new masses, skin changes, nipple changes,  nipple discharge either breast.   She denies headache, bone pain, belly pain, cough, changes in vision or gait.  Her most recent imaging includes the following:  [facility] screening mammogram.  Heterogenous a dense.  BI-RADS 1 screening MRI, no  evidence of malignancy.    1/9/2018:  In the interim,  Yanni Arauz  has done well.  Her punch biopsy returned as a nevus with mild cytologic atypia.  She has seen Dr. Vaca her dermatology in follow-up about this.  Medical oncology note dated August 29, 2017 Dr. Everardo Pappas.  Agree with annual MRI and mammogram.  At this time she would like to not pursue additional prophylactic antiestrogen.  There is no information available at this time demonstrating the efficacy of concerns or aromatase inhibitors with this specific mutation.  With a long-term strategy of risk reduction, I suggested we consider tamoxifen again in the future.  Also, at the time of menopause 12 aromatase inhibitors would be appropriate.  Follow-up with me as needed.  We agree on a routine visit in 1-2 years.    She has noted no changes in her breast exam. No new masses, skin changes, nipple changes, nipple discharge either breast.   She denies headache, bone pain, belly pain, cough, changes in vision or gait.  Her most recent imaging includes the following:  [facility] December 7, 2017 bilateral screening mammogram with 3-D.  Heterogenous leg dense breast tissue.  BI-RADS 1.  December 7, 2017, [facility] , bilateral breast MRI:  There is a 5 mm focus of suspicious enhancement in the lower outer left breast approximate 4 cm from the nipple axillary lymph nodes are normal in appearance.  Physician directed ultrasound could be performed chromic recommend either ultrasound guided or MRI directed biopsy.    Left breast ultrasound: In the 4 o'clock position of the left breast there is a focus of duct ectasia measuring 5 mm.  In the 3:00 location there is  a 5.6 mm probable internal mammary lymph node.  Conclusion the duct ectasia has a benign appearance.  It is possible that the benign appearing intramammary lymph node could represent a suspicious focus of enhancement on the recent MRI.  So would recommend MRI guided biopsy: BI-RADS IVb.     January 2, 2018 MRI guided left breast biopsy [facility]: 10-gauge Encore multiple core biopsies.  M-shaped marker placed.  Pathology returned as benign breast tissue with columnar cell change, apically metaplasia, and mild stromal fibrosis.  She tells me that she had some bruising and some drainage of the hematoma from her mammotomy.  Denies any erythema or warmth.    7/31/2018:  In the interim,  Yanni Arauz  has done well. SHe called due to persistent pain at the LEFt MRI guided bipsy site done at [facility] 1-2-18.  She tells me that the discomfort is present and has been bothering her more lately and thought shed get it checked.   She did have a sizeable hematoma, with blood leaking from mammotomy at the time of her procedure.    She has not taken any medication for this.  She has noted no other changes in her breast exam. No new masses, skin changes, nipple changes, nipple discharge either breast.     1/29/2019:  In the interim Yanni had a right breast MRI guided biopsy 4 and abnormality seen on her most recent screening MRI from December 2018.    January 24, 2019, MRI guided right breast biopsy [facility]: Right breast 12:00, 8-gauge device, multiple specimens, a post biopsy MRI imaging demonstrates adequate sampling.  Post biopsy mammogram shows clip in the place of the area of concern.  The pathology is concordant with the imaging appearance.  Routine MRI follow-up is recommended.  Pathology returned as focal hyalinized fibrous mastopathy.  Duct ectasia with cyst formation.  No significant intraductal hyperplasia or atypia.    1/21/2021:  She was last seen in clinic by Dr Mosqueda in 1/2020:  In  the interim,  Yanni Arauz  has done well.  She has noted no changes in her breast exam. No new masses, skin changes, nipple changes, nipple discharge either breast.   Her most recent imaging includes the following:  [facility] bilateral screening mammogram with 3D December 27, 2019: Scattered fibroglandular density.  BI-RADS 1  [facility] bilateral breast MRI December 27, 2019- bilateral breast MRI with and without contrast BI-RADS 1    She is seen Dr. Pappas and they have decided to proceed with no additional antiestrogen therapy at this time.    1/25/2021:  No breast concerns, no health issues.  She completed her screening mammogram and breast MRI in 12/2020.  She did have covid but has recovered.  [facility] bilateral screening mammogram with 3D December 28, 2020: Scattered fibroglandular density.  BI-RADS 1  [facility] bilateral breast MRI December 28, 2020- bilateral breast MRI with and without contrast BI-RADS 2     7/12/2021:  She returns for follow up with no breast complaints or health issues.    Breast MRI was completed 6/30/2021 with stable findings, BiRads 2 ( see full report below)    1/18/21, Interval History:  She returns today with no breast concerns or health issues, stable arthritis.    Screening with tomosynthesis on 12/27/2021 was BiRads 0, area of focal asymmetry left breast, additonal imaging recommended.  (see full report below)    Left diagnostic mammogram with tomosynthesis and left limited US are scheduled 1/26/21 at [facility].        Review of Systems:  Review of Systems   Constitutional: Negative for unexpected weight change (3 LB WT LOSS).   All other systems reviewed and are negative.       Past Medical and Surgical History:  Breast Biopsy History:   January 2, 2018 MRI guided breast biopsy   January 24, 2019, MRI guided breast biopsy      Breast Cancer HIstory:  Patient does not have a past medical history of breast  cancer.  Breast Operations, and year:  None   Obstetric/Gynecologic History:  Age menstrual periods began: 13  Patient is premenopausal, first day of last period: 11/8-13th  Number of pregnancies: 2  Number of live births: 2  Number of abortions or miscarriages: 0  Age of delivery of first child: 28  Patient did not breast feed.  Length of time taking birth control pills: since 17 on and off  Patient has never taken hormone replacement    Past Surgical History:   Procedure Laterality Date   • BREAST BIOPSY Left 2018    Benign   • COLONOSCOPY  2003    [place]   • COLONOSCOPY N/A 7/23/2021    Procedure: COLONOSCOPY;  Surgeon: Joey Jackson MD;  Location: [facility] ENDOSCOPY;  Service: Gastroenterology;  Laterality: N/A;  NORMAL COLON   • KNEE ARTHROPLASTY Right    • RIGHT OOPHORECTOMY      DUE TO ENDOMETRIOSIS   • WRIST FRACTURE SURGERY Left      Past Medical History:   Diagnosis Date   • Anxiety    • Depression    • Endometriosis    • Fibrocystic breast changes, bilateral 1/25/2021   • Rheumatoid arthritis (HCC)     JEUVENILE, AGE 6       Prior Hospitalizations, other than for surgery or childbirth, and year:  None     Social History     Socioeconomic History   • Marital status:    Tobacco Use   • Smoking status: Never Smoker   • Smokeless tobacco: Never Used   Substance and Sexual Activity   • Alcohol use: Yes     Comment: COUPLE TIMES A MONTH   • Drug use: No     Patient is .  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    Family History:  Family History   Problem Relation Age of Onset   • Hypertension Mother    • Hyperlipidemia Mother    • Breast cancer Mother 49   • Hypertension Father    • Hyperlipidemia Father    • Heart failure Father    • Cancer Father 71        COLORECTAL   • Colon cancer Father 70   • Malig Hyperthermia Neg Hx        Vital Signs:  /86 (BP Location: Right arm)    "Pulse 65   Ht 157.5 cm (62\")   Wt 66.7 kg (147 lb)   SpO2 99%   BMI 26.89 kg/m²      Medications:    Current Outpatient Medications:   •  Adalimumab (Humira Pen) 40 MG/0.4ML Pen-injector Kit, Inject 40 mg under the skin into the appropriate area as directed., Disp: , Rfl:   •  predniSONE (DELTASONE) 5 MG tablet, 10 mg daily for 2 weeks, then 5 mg daily for 2 weeks., Disp: , Rfl:   •  azelaic acid (AZELEX) 15 % gel, APPLY TO FACE TWO TIMES A DAY FOR ACNE, Disp: , Rfl:   •  desvenlafaxine (PRISTIQ) 100 MG 24 hr tablet, Take 100 mg by mouth Daily., Disp: , Rfl:   •  folic acid (FOLVITE) 1 MG tablet, Take 1 mg by mouth Daily., Disp: , Rfl:   •  hydroxychloroquine (PLAQUENIL) 200 MG tablet, Take 200 mg by mouth 2 (Two) Times a Day., Disp: , Rfl:   •  lisinopril (PRINIVIL,ZESTRIL) 10 MG tablet, Take 10 mg by mouth Daily., Disp: , Rfl:   •  methotrexate 2.5 MG tablet, Take 2.5 mg by mouth 1 (One) Time Per Week. 25 MG ON MONDAY, Disp: , Rfl:      Allergies:  No Known Allergies    Physical Examination:  /86 (BP Location: Right arm)   Pulse 65   Ht 157.5 cm (62\")   Wt 66.7 kg (147 lb)   SpO2 99%   BMI 26.89 kg/m²      I reviewed physical exam, no changes noted    General Appearance:  Patient is in no distress.  She is well kept and has a  average  build.   Psychiatric:  Patient with appropriate mood and affect. Alert and oriented to self, time, and place.    Breast, RIGHT:  small sized, symmetric with the contralateral side.  Breast skin is without erythema, edema, rashes.  There are no visible abnormalities upon inspection during the arm-raising maneuver or with hands on hips in the sitting position. There is no nipple retraction, discharge or nipple/areolar skin changes.There are no masses palpable in the sitting or supine positions.     Breast, LEFT:   small sized, symmetric with the contralateral side.  Breast skin is without erythema, edema, rashes.  There are no visible abnormalities upon inspection " during the arm-raising maneuver or with hands on hips in the sitting position. There is no nipple retraction, discharge or nipple/areolar skin changes.There are no masses palpable in the sitting or supine positions.     Lymphatic:  There is no axillary, cervical, infraclavicular, or supraclavicular adenopathy bilaterally.  Musculoskeletal:  Good strength in all 4 extremities.  There is good range of motion in both shoulders.   Skin:  No new skin lesions or rashes on the skin excluding the breast (see breast exam above).    Imagin2021:  Breast MRI at [facility]  FINDINGS: There is heterogeneous fibroglandular tissue. There is marked background parenchymal enhancement, which limits the sensitivity for detection of invasive malignancy and DCIS.   RIGHT BREAST:    No suspicious enhancing mass or area of non-mass enhancement is identified.  The visualized axilla is within normal limits.    LEFT BREAST:    No suspicious enhancing mass or area of non-mass enhancement is identified.  The visualized axilla is within normal limits.    EXTRAMAMMARY FINDINGS:   There are no abnormally enlarged internal mammary chain lymph nodes on either side.     There is a 1.1 cm STIR hyperintense lesion in the left hepatic lobe, which previously measured 1.3 cm when remeasured in 2018.     IMPRESSION AND RECOMMENDATION: No MRI evidence of malignancy in either breast. Recommend annual screening mammogram in 2021 and screening breast MRI in one year.   BI-RADS Category 1: Negative    21:  Bilateral screening mammogram with tomosynthesis at [facility]  FINDINGS: Bilateral digital CC and MLO mammographic and Tomosynthesis images were obtained. Comparison is made to prior examinations dated 2020 and 2019.   Scattered fibroglandular densities are seen throughout both breasts. In the posterior one third of the left breast located slightly lateral to the plane the nipple there is an area of focal asymmetry. I see no  suspicious calcifications or areas of  architectural distortion in either breast. There is no evidence for skin thickening, nipple retraction or axillary adenopathy.   IMPRESSION:  1. There is an area of focal asymmetry in the posterior one third of the left breast located slightly lateral to the plane of the nipple. Further evaluation with spot compression CC mammographic and Tomosynthesis images and possible targeted left breast sonography is recommended.  2. There are no findings suspicious for malignancy in the right breast.   BI-RADS Category 0: Incomplete. Needs additional imaging evaluation.    Pathology:  Pathology from punch biopsy returned as nevus with mild cytologic atypia.     1-16-17  [facility]  PATHOLOGY  JUAN LUIS HANEY  Final Diagnosis   LEFT BREAST BIOPSY (SKIN):  SKIN WITH COMPOUND NEVUS WITH MILD CYTOLOGIC ATYPIA.   MARGIN FREE.       [facility]/a/[facility]  [facility]/juan pablo      MRI guided breast biopsy [facility] January 24, 2019: Right breast 12:00, 8-gauge device, multiple specimens, a post biopsy MRI imaging demonstrates adequate sampling.  Post biopsy mammogram shows clip in the place of the area of concern.  The pathology is concordant with the imaging appearance.  Routine MRI follow-up is recommended.  Pathology returned as focal hyalinized fibrous mastopathy.  Duct ectasia with cyst formation.  No significant intraductal hyperplasia or atypia.    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   Seeing her back in approximately 6 months.     7-28-16                         [facility] MED ONC                           JUAN LUIS CROUCH MD     PLAN:  She is tolerating tamoxifen well with no adverse events or  complications. I will plan on seeing her back in approximately 6 months.  Medical oncology note dated August 29, 2017 Dr. Everardo Pappas.  Agree with annual MRI and mammogram.  At this time she would like to not pursue additional prophylactic antiestrogen.  There is no information available at this time demonstrating the efficacy of concerns or aromatase inhibitors with this specific mutation.  With a long-term strategy of risk reduction, I suggested we consider tamoxifen again in the future.  Also, at the time of menopause 12 aromatase inhibitors would be appropriate.  Follow-up with me as needed.  We agree on a routine visit in 1-2 years.    [facility] note dated January 2, 2018 MRI guided breast biopsy: 10-gauge Encore multiple core biopsies.  M-shaped marker placed.  Addendum will be provided when the pathology is available.    Procedures:  Punch biopsy skin lesion 1-12-17:  Indication:  skin lesion of indeterminate significance  Location: LEFT breast UOQ,  inner ring  Consent:  The risks, benefits, and alternatives to the procedure were discussed with the patient, who understood and wished to proceed.  The risks described included, but were not limited to, bleeding and infection.  Description of Procedure:   After the patient was positioned supine on the procedure table, I prepped and draped the affected breast skin in sterile fashion.  I anesthetized the affected skin and subcutaneous tissue with 1% lidocaine with epinephrine.  I then I then took a 8mm punch biopsy of the affected skin.   Manual compression was held for 5 minutes, 3x 4-0 nylon sutures were placed to approximate the skin edges. Antibacterial ointment  and a sterile dressing were applied.  Tolerance: The patient tolerated the procedure well.  Disposition: We will see her back within a week to discuss her pathology.    Assessment:    1. Benign breast biopsy, right 1/2019, left 1/2018  --RIGHT central- seen on CC only on mammogram , MRI  correlate of enhancing focus- BR4- no papable finding  MRI guided breast biopsy [facility] January 24, 2019:-focal hyalinized fibrous mastopathy.  Duct ectasia with cyst formation.  No significant intraductal hyperplasia or atypia. Concordant    --MRI in December 2017 [facility] lower outer quadrant 5 mm area of enhancement, no ultrasound correlate.  MRI guided biopsy showed benign breast with columnar cell change apically and metaplasia and stromal fibrosis.      2-  Left breast skin biopsy  LEFT breast UOQ, inner ring, 5mm hyperpigmented lesion with some border irregularity and heterogeneity of color   - removed with punch 1-11-17  - [facility]    3  Family history of breast cancer, mother age 49    4- Positive for mutation in  JAMES: [id number]- diagnosed via MyRisk from Dr Fajardo, lifetime risk of 17-52%  Dr Erwin Tamoxifen since 1-2016- saw Dr Papaps- stopped this 2017.  Consider an aromatase inhibitor once postmenopausal.  -Annual mammography and MRI.    5- abnormal imaging left breast, additional imaging recommended    Discussion:  Imaging findings were reviewed.  We discussed the recommendations for additional imaging to further evaluate the focal asymmetry seen in the left breast with screening mammogram.  Those studies are scheduled for 1/26/22 and I will call her with the results.  She is in agreement with this plan.      Plan:  I reviewed her interval history, imaging, imaging reports and examination together today.    Left diagnostic mammogram with tomosynthesis and left limited US are scheduled 1/26/22, I will call her with those imaging findings and recommendations.      We previously discussed that the JAMES mutation that she carries harbors a lifetime risk of 17-52%, but likely closer to the 52% end ofthe spectrum due to the exact mutation.  We discussed previously surgical risk reduction strategies as well as pharmacologic risk reduction and  enhanced imaging surveillance.  She preferred to proceed with the latter.  A copy of ASK2ME assessment was given and reviewed for her risk of breast and other cancers.    She does have surveillance with her dermatologist for the atypical nevus for annual skin screening.    She does not see another provider at a 6 month interval for CBE and is interested in obtaining this here.  We will also alternate her MRI and screening mammogram so she receives those studies at 6 month intervals.      - breast MRI in 6 months at [facility] followed by exam    I've asked her to continue her self breast exam monthly and to call us in the interim with concerns and we'd be happy to see her back sooner.    Her blood pressure is elevated today, she was advised to monitor at home and see her PCP if not improved.     She has been instructed to call Dr. Pappas once she is closer to the postmenopausal period to reconsider aromatase inhibitor as risk reducing intervention.    MILI Wynn/transcription disclaimer:  Dictated using Dragon dictation

## 2022-01-19 ENCOUNTER — TELEPHONE (OUTPATIENT)
Dept: SURGERY | Facility: CLINIC | Age: 47
End: 2022-01-19

## 2022-01-26 ENCOUNTER — HOSPITAL ENCOUNTER (OUTPATIENT)
Dept: MAMMOGRAPHY | Facility: HOSPITAL | Age: 47
Discharge: HOME | End: 2022-01-26
Admitting: NURSE PRACTITIONER

## 2022-01-26 ENCOUNTER — HOSPITAL ENCOUNTER (OUTPATIENT)
Dept: ULTRASOUND IMAGING | Facility: HOSPITAL | Age: 47
Discharge: HOME | End: 2022-01-26
Admitting: NURSE PRACTITIONER

## 2022-01-26 DIAGNOSIS — R92.8 ABNORMAL FINDING ON BREAST IMAGING: ICD-10-CM

## 2022-01-26 PROCEDURE — 76642 ULTRASOUND BREAST LIMITED: CPT

## 2022-01-26 PROCEDURE — 77065 DX MAMMO INCL CAD UNI: CPT

## 2022-01-26 PROCEDURE — G0279 TOMOSYNTHESIS, MAMMO: HCPCS

## 2022-02-01 ENCOUNTER — TELEPHONE (OUTPATIENT)
Dept: SURGERY | Facility: CLINIC | Age: 47
End: 2022-02-01

## 2022-02-01 DIAGNOSIS — R92.8 ABNORMAL FINDING ON BREAST IMAGING: Primary | ICD-10-CM

## 2022-02-01 NOTE — TELEPHONE ENCOUNTER
Left diagnostic mammogram with julissa and left limited US from 1/27/22 reported to patient.    Follow up in 6 months with left US will be scheduled along with already scheduled breast MRI.  Clinic follow up in 8/22.    IMPRESSION:  1. There is a 0.8 cm area of probable benign adenosis in the left breast  at the 5-o'clock position on the order of 2 cm from the nipple.  Six-month sonographic follow-up is recommended.  2. There is a 1.5 cm cluster of cysts seen in the left breast at the  1-o'clock position that corresponds to the mammographically visualized  density. No additional short-term imaging follow-up regarding the cyst  is indicated.     BI-RADS Category 3: Probably benign finding. Short-term imaging  follow-up is recommended.

## 2022-02-08 ENCOUNTER — TELEPHONE (OUTPATIENT)
Dept: SURGERY | Facility: CLINIC | Age: 47
End: 2022-02-08

## 2022-02-08 NOTE — TELEPHONE ENCOUNTER
Left message for pt to call back regarding her left breast U/S appt.  This is scheduled at [facility] on 08/02/2023 arrive at 9:45.  Appointment reminder mailed also.     ALEE

## 2022-07-05 ENCOUNTER — APPOINTMENT (OUTPATIENT)
Dept: MRI IMAGING | Facility: HOSPITAL | Age: 47
End: 2022-07-05

## 2022-08-01 ENCOUNTER — HOSPITAL ENCOUNTER (OUTPATIENT)
Dept: MRI IMAGING | Facility: HOSPITAL | Age: 47
Discharge: HOME | End: 2022-08-01
Admitting: NURSE PRACTITIONER

## 2022-08-01 DIAGNOSIS — Z80.3 FH: BREAST CANCER: ICD-10-CM

## 2022-08-01 DIAGNOSIS — Z15.09 MONOALLELIC MUTATION OF ATM GENE: ICD-10-CM

## 2022-08-01 DIAGNOSIS — N60.11 FIBROCYSTIC BREAST CHANGES, BILATERAL: ICD-10-CM

## 2022-08-01 DIAGNOSIS — N60.12 FIBROCYSTIC BREAST CHANGES, BILATERAL: ICD-10-CM

## 2022-08-01 DIAGNOSIS — Z15.01 MONOALLELIC MUTATION OF ATM GENE: ICD-10-CM

## 2022-08-01 DIAGNOSIS — Z15.89 MONOALLELIC MUTATION OF ATM GENE: ICD-10-CM

## 2022-08-01 DIAGNOSIS — Z91.89 INCREASED RISK OF BREAST CANCER: ICD-10-CM

## 2022-08-01 PROCEDURE — 82565 ASSAY OF CREATININE: CPT

## 2022-08-01 PROCEDURE — A9577 INJ MULTIHANCE: HCPCS | Performed by: NURSE PRACTITIONER

## 2022-08-01 PROCEDURE — 77049 MRI BREAST C-+ W/CAD BI: CPT

## 2022-08-01 PROCEDURE — 0 GADOBENATE DIMEGLUMINE 529 MG/ML SOLUTION: Performed by: NURSE PRACTITIONER

## 2022-08-01 RX ADMIN — GADOBENATE DIMEGLUMINE 13 ML: 529 INJECTION, SOLUTION INTRAVENOUS at 19:12

## 2022-08-02 ENCOUNTER — TELEPHONE (OUTPATIENT)
Dept: SURGERY | Facility: CLINIC | Age: 47
End: 2022-08-02

## 2022-08-02 ENCOUNTER — HOSPITAL ENCOUNTER (OUTPATIENT)
Dept: ULTRASOUND IMAGING | Facility: HOSPITAL | Age: 47
Discharge: HOME | End: 2022-08-02
Admitting: NURSE PRACTITIONER

## 2022-08-02 ENCOUNTER — PREP FOR SURGERY (OUTPATIENT)
Dept: OTHER | Facility: HOSPITAL | Age: 47
End: 2022-08-02

## 2022-08-02 DIAGNOSIS — R92.8 ABNORMAL FINDINGS ON DIAGNOSTIC IMAGING OF BREAST: Primary | ICD-10-CM

## 2022-08-02 DIAGNOSIS — R92.8 ABNORMAL FINDING ON BREAST IMAGING: ICD-10-CM

## 2022-08-02 LAB — CREAT BLDA-MCNC: 0.7 MG/DL (ref 0.6–1.3)

## 2022-08-02 PROCEDURE — 76642 ULTRASOUND BREAST LIMITED: CPT

## 2022-08-02 NOTE — TELEPHONE ENCOUNTER
Called pt with results of MRI     IMPRESSION:  1. There is discontinuous linear enhancement interposed with a cluster  of cysts in the posterior one-third of the left breast at the 3-o'clock  position. This is in the region of an area of mammographic increased  density seen on the mammogram of 12/27/2021. Correlation with an MR  guided left breast biopsy is recommended.  2. There is linear enhancement seen in the left breast at the 5-o'clock  position on the order of 3 cm from the nipple that measures up to 2 cm  in length. Correlation with an MR guided left breast biopsy is  recommended.  3. There are no findings suspicious for malignancy in the right breast.     BI-RADS category 4:

## 2022-08-04 ENCOUNTER — TELEPHONE (OUTPATIENT)
Dept: SURGERY | Facility: CLINIC | Age: 47
End: 2022-08-04

## 2022-08-04 NOTE — TELEPHONE ENCOUNTER
[facility] @ 7:45am & 8:45am, arrive @ 6:45am.    Patient has been placed on Wait list.    Called Pt and LM for her to call back to confirm appointment.    Appointment reminder sent in mail.

## 2022-08-05 ENCOUNTER — TELEPHONE (OUTPATIENT)
Dept: SURGERY | Facility: CLINIC | Age: 47
End: 2022-08-05

## 2022-08-05 NOTE — TELEPHONE ENCOUNTER
Patient's F/u appointment moved out to follow MRI Breast Bx scheduled on 9/15/2022.     F/u appointment scheduled with Jesus Tobar NP on 9/22/2022 @ 9:20am.    Called Pt. Patient expressed v/u of appointment change.    Appointment reminder sent in mail.

## 2022-09-15 ENCOUNTER — HOSPITAL ENCOUNTER (OUTPATIENT)
Dept: MRI IMAGING | Facility: HOSPITAL | Age: 47
Discharge: HOME | End: 2022-09-15

## 2022-09-15 ENCOUNTER — HOSPITAL ENCOUNTER (OUTPATIENT)
Dept: MAMMOGRAPHY | Facility: HOSPITAL | Age: 47
Discharge: HOME | End: 2022-09-15

## 2022-09-15 VITALS
TEMPERATURE: 98 F | HEIGHT: 62 IN | RESPIRATION RATE: 16 BRPM | WEIGHT: 147 LBS | SYSTOLIC BLOOD PRESSURE: 135 MMHG | OXYGEN SATURATION: 99 % | DIASTOLIC BLOOD PRESSURE: 88 MMHG | BODY MASS INDEX: 27.05 KG/M2 | HEART RATE: 71 BPM

## 2022-09-15 DIAGNOSIS — R92.8 ABNORMAL FINDINGS ON DIAGNOSTIC IMAGING OF BREAST: ICD-10-CM

## 2022-09-15 DIAGNOSIS — Z98.890 STATUS POST BIOPSY: ICD-10-CM

## 2022-09-15 PROCEDURE — C1894 INTRO/SHEATH, NON-LASER: HCPCS

## 2022-09-15 PROCEDURE — 82565 ASSAY OF CREATININE: CPT

## 2022-09-15 PROCEDURE — A9577 INJ MULTIHANCE: HCPCS | Performed by: NURSE PRACTITIONER

## 2022-09-15 PROCEDURE — 0 LIDOCAINE 1 % SOLUTION: Performed by: NURSE PRACTITIONER

## 2022-09-15 PROCEDURE — 77065 DX MAMMO INCL CAD UNI: CPT

## 2022-09-15 PROCEDURE — 88305 TISSUE EXAM BY PATHOLOGIST: CPT | Performed by: NURSE PRACTITIONER

## 2022-09-15 PROCEDURE — 0 GADOBENATE DIMEGLUMINE 529 MG/ML SOLUTION: Performed by: NURSE PRACTITIONER

## 2022-09-15 RX ORDER — DIAZEPAM 5 MG/1
5 TABLET ORAL ONCE AS NEEDED
Status: CANCELLED | OUTPATIENT
Start: 2022-09-15

## 2022-09-15 RX ORDER — TOFACITINIB 10 MG/1
10 TABLET, FILM COATED ORAL DAILY
COMMUNITY

## 2022-09-15 RX ORDER — LIDOCAINE HYDROCHLORIDE 10 MG/ML
1 INJECTION, SOLUTION INFILTRATION; PERINEURAL ONCE
Status: COMPLETED | OUTPATIENT
Start: 2022-09-15 | End: 2022-09-15

## 2022-09-15 RX ORDER — DIAZEPAM 5 MG/1
5 TABLET ORAL ONCE AS NEEDED
Status: COMPLETED | OUTPATIENT
Start: 2022-09-15 | End: 2022-09-15

## 2022-09-15 RX ADMIN — DIAZEPAM 5 MG: 5 TABLET ORAL at 07:30

## 2022-09-15 RX ADMIN — LIDOCAINE HYDROCHLORIDE 1 ML: 10 INJECTION, SOLUTION INFILTRATION; PERINEURAL at 09:01

## 2022-09-15 RX ADMIN — LIDOCAINE HYDROCHLORIDE 1 ML: 10 INJECTION, SOLUTION INFILTRATION; PERINEURAL at 09:02

## 2022-09-15 RX ADMIN — GADOBENATE DIMEGLUMINE 13 ML: 529 INJECTION, SOLUTION INTRAVENOUS at 09:07

## 2022-09-15 NOTE — NURSING NOTE
MRI Biopsy sites x2 to left outer breast, one site more posterior than the other. Both sites clear with Dermabond dry and intact. No firmness or swelling noted at or around either biopsy site. Denies pain. Ice pack with protective covering applied to biopsy sites. Patient awake, alert, and oriented x4. Discharge instructions discussed with understanding voiced by patient. Copies provided to patient. No distress noted. To discharge exit via wheelchair per this nurse. To home via private vehicle driven by her .

## 2022-09-15 NOTE — H&P
Name: Yanni Arauz ADMIT: 9/15/2022   : 1975  PCP: Elizabeth Feliz MD    MRN: [id number] LOS: 0 days   AGE/SEX: 47 y.o. female  ROOM: Room/bed info not found       Chief complaint L breast NME    Present Illness or Internal History:  Patient is a 47 y.o. female presents with L breast NME for MRI bx x2.     Past Surgical History:  Past Surgical History:   Procedure Laterality Date   • BREAST BIOPSY Left 2018    Benign   • COLONOSCOPY  2003   • COLONOSCOPY N/A 2021    Procedure: COLONOSCOPY;  Surgeon: Joey Jackson MD;  Location: [facility] ENDOSCOPY;  Service: Gastroenterology;  Laterality: N/A;  NORMAL COLON   • KNEE ARTHROPLASTY Right    • RIGHT OOPHORECTOMY      DUE TO ENDOMETRIOSIS   • WRIST FRACTURE SURGERY Left        Past Medical History:  Past Medical History:   Diagnosis Date   • Anxiety    • Depression    • Endometriosis    • Fibrocystic breast changes, bilateral 2021   • Rheumatoid arthritis (HCC)     JEUVENILE, AGE 6       Home Medications:  (Not in a hospital admission)      Allergies:  Patient has no known allergies.    Family History:  Family History   Problem Relation Age of Onset   • Hypertension Mother    • Hyperlipidemia Mother    • Breast cancer Mother 49   • Hypertension Father    • Hyperlipidemia Father    • Heart failure Father    • Cancer Father 71        COLORECTAL   • Colon cancer Father 70   • Malig Hyperthermia Neg Hx        Social History:  Social History     Tobacco Use   • Smoking status: Never Smoker   • Smokeless tobacco: Never Used   Substance Use Topics   • Alcohol use: Yes     Comment: COUPLE TIMES A MONTH   • Drug use: No        Objective     Physical Exam:    No exam performed today,    Vital Signs  Temp:  [98 °F (36.7 °C)] 98 °F (36.7 °C)  Heart Rate:  [82] 82  Resp:  [16] 16  BP: (132)/(86) 132/86    Anticipated Surgical Procedure:  Left breast MRI guided core needle biopsy x 2    The risks, benefits and alternatives of this procedure  have been discussed with the patient or responsible party: Yes        Lisa Centeno MD  09/15/22  07:35 EDT

## 2022-09-16 LAB
LAB AP CASE REPORT: NORMAL
PATH REPORT.FINAL DX SPEC: NORMAL
PATH REPORT.GROSS SPEC: NORMAL

## 2022-09-22 ENCOUNTER — OFFICE VISIT (OUTPATIENT)
Dept: SURGERY | Facility: CLINIC | Age: 47
End: 2022-09-22

## 2022-09-22 ENCOUNTER — TRANSCRIBE ORDERS (OUTPATIENT)
Dept: ADMINISTRATIVE | Facility: HOSPITAL | Age: 47
End: 2022-09-22

## 2022-09-22 VITALS
DIASTOLIC BLOOD PRESSURE: 78 MMHG | HEART RATE: 77 BPM | BODY MASS INDEX: 28.52 KG/M2 | HEIGHT: 62 IN | WEIGHT: 155 LBS | OXYGEN SATURATION: 97 % | SYSTOLIC BLOOD PRESSURE: 122 MMHG

## 2022-09-22 DIAGNOSIS — Z91.89 INCREASED RISK OF BREAST CANCER: ICD-10-CM

## 2022-09-22 DIAGNOSIS — N60.11 FIBROCYSTIC BREAST CHANGES, BILATERAL: ICD-10-CM

## 2022-09-22 DIAGNOSIS — R92.8 ABNORMAL FINDING ON BREAST IMAGING: ICD-10-CM

## 2022-09-22 DIAGNOSIS — Z80.3 FH: BREAST CANCER: Primary | ICD-10-CM

## 2022-09-22 DIAGNOSIS — N60.12 FIBROCYSTIC BREAST CHANGES, BILATERAL: ICD-10-CM

## 2022-09-22 DIAGNOSIS — Z12.31 VISIT FOR SCREENING MAMMOGRAM: Primary | ICD-10-CM

## 2022-09-22 PROCEDURE — 99213 OFFICE O/P EST LOW 20 MIN: CPT | Performed by: NURSE PRACTITIONER

## 2022-09-22 RX ORDER — TOFACITINIB 10 MG/1
TABLET, FILM COATED ORAL
COMMUNITY
Start: 2022-09-07 | End: 2022-09-22 | Stop reason: SDUPTHER

## 2022-09-22 NOTE — PROGRESS NOTES
Chief Complaint: Yanni Arauz is a 47 y.o. 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   Her mother had breast cancer diagnosed at age 49, so Dr Fajardo sent a My Risk panel, which retured as positive for an JAMES mutation.      She noted no masses, skin changes, nipple discharge, nipple changes prior to her most recent imaging.   Her most recent imaging includes:  10/29/12        [facility]         Mammo Screening Bilateral            Yanni Arauz.  The breasts are heterogenously dense.  Ill-defined subcentimeter opacities are seen in the mid medial left breast on the CC view, and in the mid inferior right breast on the MLO view.  IMPRESSION:  Birads 0.    11/13/12        [facility]         Mammo Diagnostic Bilateral            Yanni Arauz.  The previously noted asymmetries int he medial left breast and inferior right breast do not persist on the additional views.   Birads 2.     10/30/13       [facility]         Mammo Screening Bilateral w/CAD           Yanni Arauz.  Birads 2.    11/20/14        [facility]         Digital Screening          Yanni Arauz.  Birads 2 Benign.  Breast Composition: Heterogeneously dense.      We then arranged for her to have a breast MRI, as follows:    08/11/15      [facility]         Breast Bilateral MRI          ChavesYanni.  Birads 1 Negative.    She has her uterus and ovaries, is premenopausal, and takes no hormones.  Her family history includes the following: Shehas one daughter, one son, one sister, 2 MA, 1 MU, 4 PA, 3 PU. Her mother had breast cancer diagnosed at age 49, alive in 2015.  No other breast and no ovarian cancer in her family.    1/12/2017: Saw Dr. Reid  In the interim,  Yanni Arauz  has done well.  She has noted no changes in her breast exam. No new masses, skin changes,  nipple changes, nipple discharge either breast.   She denies headache, bone pain, belly pain, cough, changes in vision or gait.  Her most recent imaging includes the following:  [facility] November 29, 2016 screening mammogram.  Heterogenous a dense.  BI-RADS 1 screening MRI, no  evidence of malignancy.    1/8/2018: Saw Dr. Mosqueda  In the interim,  Yanni Arauz  has done well.  Her punch biopsy returned as a nevus with mild cytologic atypia.  She has seen Dr. Vaca her dermatology in follow-up about this.  Medical oncology note dated August 29, 2017 Dr. Everardo Pappas.  Agree with annual MRI and mammogram.  At this time she would like to not pursue additional prophylactic antiestrogen.  There is no information available at this time demonstrating the efficacy of concerns or aromatase inhibitors with this specific mutation.  With a long-term strategy of risk reduction, I suggested we consider tamoxifen again in the future.  Also, at the time of menopause 12 aromatase inhibitors would be appropriate.  Follow-up with me as needed.  We agree on a routine visit in 1-2 years.    She has noted no changes in her breast exam. No new masses, skin changes, nipple changes, nipple discharge either breast.   She denies headache, bone pain, belly pain, cough, changes in vision or gait.  Her most recent imaging includes the following:  [facility] December 7, 2017 bilateral screening mammogram with 3-D.  Heterogenous leg dense breast tissue.  BI-RADS 1.  December 7, 2017, [facility] , bilateral breast MRI:  There is a 5 mm focus of suspicious enhancement in the lower outer left breast approximate 4 cm from the nipple axillary lymph nodes are normal in appearance.  Physician directed ultrasound could be performed chromic recommend either ultrasound guided or MRI directed biopsy.    Left breast ultrasound: In the 4 o'clock position of the left breast there is a focus of duct ectasia measuring 5  mm.  In the 3:00 location there is a 5.6 mm probable internal mammary lymph node.  Conclusion the duct ectasia has a benign appearance.  It is possible that the benign appearing intramammary lymph node could represent a suspicious focus of enhancement on the recent MRI.  So would recommend MRI guided biopsy: BI-RADS IVb.     January 2, 2018 MRI guided left breast biopsy [facility]: 10-gauge Encore multiple core biopsies.  M-shaped marker placed.  Pathology returned as benign breast tissue with columnar cell change, apically metaplasia, and mild stromal fibrosis.  She tells me that she had some bruising and some drainage of the hematoma from her mammotomy.  Denies any erythema or warmth.    7/31/2018: Saw Dr. Mosqueda  In the interim,  Yanni Arauz  has done well. SHe called due to persistent pain at the LEFt MRI guided bipsy site done at [facility] 1-2-18.  She tells me that the discomfort is present and has been bothering her more lately and thought shed get it checked.   She did have a sizeable hematoma, with blood leaking from mammotomy at the time of her procedure.    She has not taken any medication for this.  She has noted no other changes in her breast exam. No new masses, skin changes, nipple changes, nipple discharge either breast.     1/29/2019: Saw Dr. Mosqueda  In the interim Yanni had a right breast MRI guided biopsy 4 and abnormality seen on her most recent screening MRI from December 2018.    January 24, 2019, MRI guided right breast biopsy [facility]: Right breast 12:00, 8-gauge device, multiple specimens, a post biopsy MRI imaging demonstrates adequate sampling.  Post biopsy mammogram shows clip in the place of the area of concern.  The pathology is concordant with the imaging appearance.  Routine MRI follow-up is recommended.  Pathology returned as focal hyalinized fibrous mastopathy.  Duct ectasia with cyst formation.  No significant intraductal hyperplasia or  atypia.    1/21/2020: Saw Dr. Mosqueda  She was last seen in clinic by Dr Mosqueda in 1/2020:  In the interim,  Yanni Arauz  has done well.  She has noted no changes in her breast exam. No new masses, skin changes, nipple changes, nipple discharge either breast.   Her most recent imaging includes the following:  [facility] bilateral screening mammogram with 3D December 27, 2019: Scattered fibroglandular density.  BI-RADS 1  [facility] bilateral breast MRI December 27, 2019- bilateral breast MRI with and without contrast BI-RADS 1    She is seen Dr. Pappas and they have decided to proceed with no additional antiestrogen therapy at this time.    1/25/2021: Saw MILI [place]  No breast concerns, no health issues.  She completed her screening mammogram and breast MRI in 12/2020.  She did have covid but has recovered.  [facility] bilateral screening mammogram with 3D December 28, 2020: Scattered fibroglandular density.  BI-RADS 1  [facility] bilateral breast MRI December 28, 2020- bilateral breast MRI with and without contrast BI-RADS 2     7/12/2021: Saw MILI [place]  She returns for follow up with no breast complaints or health issues.    Breast MRI was completed 6/30/2021 with stable findings, BiRads 2 ( see full report below)    1/18/22 Saw MILI [place]  She returns today with no breast concerns or health issues, stable arthritis.    Screening with tomosynthesis on 12/27/2021 was BiRads 0, area of focal asymmetry left breast, additonal imaging recommended.  (see full report below)    Left diagnostic mammogram with tomosynthesis and left limited US are scheduled 1/26/21 at [facility].    9/22/2022 Interval History  Returning to the office today for routine follow up  Recent biopsies x 2 returned as PASH. No new breast concerns today.  Still bruised and sore at biopsy site.     Review of Systems:  Review of Systems   Constitutional: Negative for  unexpected weight change (3 LB WT LOSS).   All other systems reviewed and are negative.       Past Medical and Surgical History:  Breast Biopsy History:   January 2, 2018 MRI guided breast biopsy   January 24, 2019, MRI guided breast biopsy      Breast Cancer HIstory:  Patient does not have a past medical history of breast cancer.  Breast Operations, and year:  None   Obstetric/Gynecologic History:  Age menstrual periods began: 13  Patient is premenopausal, first day of last period: 11/8-13th  Number of pregnancies: 2  Number of live births: 2  Number of abortions or miscarriages: 0  Age of delivery of first child: 28  Patient did not breast feed.  Length of time taking birth control pills: since 17 on and off  Patient has never taken hormone replacement    Past Surgical History:   Procedure Laterality Date   • BREAST BIOPSY Left 2018    Benign   • COLONOSCOPY  2003    [place]   • COLONOSCOPY N/A 7/23/2021    Procedure: COLONOSCOPY;  Surgeon: Joey Jackson MD;  Location: [facility] ENDOSCOPY;  Service: Gastroenterology;  Laterality: N/A;  NORMAL COLON   • KNEE ARTHROPLASTY Right    • RIGHT OOPHORECTOMY      DUE TO ENDOMETRIOSIS   • WRIST FRACTURE SURGERY Left      Past Medical History:   Diagnosis Date   • Anxiety    • Depression    • Endometriosis    • Fibrocystic breast changes, bilateral 1/25/2021   • Rheumatoid arthritis (HCC)     JEUVENILE, AGE 6       Prior Hospitalizations, other than for surgery or childbirth, and year:  None     Social History     Socioeconomic History   • Marital status:    Tobacco Use   • Smoking status: Never Smoker   • Smokeless tobacco: Never Used   Substance and Sexual Activity   • Alcohol use: Yes     Comment: COUPLE TIMES A MONTH   • Drug use: No     Patient is .  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    Family History:  Family History   Problem Relation  "Age of Onset   • Hypertension Mother    • Hyperlipidemia Mother    • Breast cancer Mother 49   • Hypertension Father    • Hyperlipidemia Father    • Heart failure Father    • Cancer Father 71        COLORECTAL   • Colon cancer Father 70   • Malig Hyperthermia Neg Hx        Vital Signs:  /78 (BP Location: Right arm, Patient Position: Sitting, Cuff Size: Adult)   Pulse 77   Ht 157.5 cm (62\")   Wt 70.3 kg (155 lb)   SpO2 97%   BMI 28.35 kg/m²      Medications:    Current Outpatient Medications:   •  methotrexate 2.5 MG tablet, Take 25 mg by mouth., Disp: , Rfl:   •  Tofacitinib Citrate (Xeljanz) 10 MG tablet, , Disp: , Rfl:   •  busPIRone (BUSPAR) 2.5 MG half tablet, Take 2.5 mg by mouth 2 (Two) Times a Day., Disp: , Rfl:   •  desvenlafaxine (PRISTIQ) 100 MG 24 hr tablet, Take 100 mg by mouth Daily., Disp: , Rfl:   •  folic acid (FOLVITE) 1 MG tablet, Take 1 mg by mouth Daily., Disp: , Rfl:   •  hydroxychloroquine (PLAQUENIL) 200 MG tablet, Take 200 mg by mouth 2 (Two) Times a Day., Disp: , Rfl:   •  lisinopril (PRINIVIL,ZESTRIL) 10 MG tablet, Take 10 mg by mouth Daily., Disp: , Rfl:   •  methotrexate 2.5 MG tablet, Take 2.5 mg by mouth 1 (One) Time Per Week. 25 MG ON MONDAY, Disp: , Rfl:   •  predniSONE (DELTASONE) 5 MG tablet, 10 mg daily for 2 weeks, then 5 mg daily for 2 weeks., Disp: , Rfl:   •  Tofacitinib Citrate (Xeljanz) 10 MG tablet, Take 10 mg by mouth Daily., Disp: , Rfl:      Allergies:  No Known Allergies    Physical Examination:  /78 (BP Location: Right arm, Patient Position: Sitting, Cuff Size: Adult)   Pulse 77   Ht 157.5 cm (62\")   Wt 70.3 kg (155 lb)   SpO2 97%   BMI 28.35 kg/m²      I reviewed physical exam, no changes noted    General Appearance:  Patient is in no distress.  She is well kept and has a  average  build.   Psychiatric:  Patient with appropriate mood and affect. Alert and oriented to self, time, and place.    Breast, RIGHT:  small sized, symmetric with the " contralateral side.  Breast skin is without erythema, edema, rashes.  There are no visible abnormalities upon inspection during the arm-raising maneuver or with hands on hips in the sitting position. There is no nipple retraction, discharge or nipple/areolar skin changes.There are no masses palpable in the sitting or supine positions.     Breast, LEFT:   small sized, symmetric with the contralateral side.  Breast skin is without erythema, edema, rashes.  There are no visible abnormalities upon inspection during the arm-raising maneuver or with hands on hips in the sitting position. There is no nipple retraction, discharge or nipple/areolar skin changes.There are no masses palpable in the sitting or supine positions.     Lymphatic:  There is no axillary, cervical, infraclavicular, or supraclavicular adenopathy bilaterally.    Imagin2021:  Breast MRI at [facility]  FINDINGS: There is heterogeneous fibroglandular tissue. There is marked background parenchymal enhancement, which limits the sensitivity for detection of invasive malignancy and DCIS.   RIGHT BREAST:    No suspicious enhancing mass or area of non-mass enhancement is identified.  The visualized axilla is within normal limits.    LEFT BREAST:    No suspicious enhancing mass or area of non-mass enhancement is identified.  The visualized axilla is within normal limits.    EXTRAMAMMARY FINDINGS:   There are no abnormally enlarged internal mammary chain lymph nodes on either side.     There is a 1.1 cm STIR hyperintense lesion in the left hepatic lobe, which previously measured 1.3 cm when remeasured in 2018.     IMPRESSION AND RECOMMENDATION: No MRI evidence of malignancy in either breast. Recommend annual screening mammogram in 2021 and screening breast MRI in one year.   BI-RADS Category 1: Negative    21:  Bilateral screening mammogram with tomosynthesis at [facility]  FINDINGS: Bilateral digital CC and MLO mammographic and Tomosynthesis  images were obtained. Comparison is made to prior examinations dated 12/28/2020 and 12/27/2019.   Scattered fibroglandular densities are seen throughout both breasts. In the posterior one third of the left breast located slightly lateral to the plane the nipple there is an area of focal asymmetry. I see no suspicious calcifications or areas of  architectural distortion in either breast. There is no evidence for skin thickening, nipple retraction or axillary adenopathy.   IMPRESSION:  1. There is an area of focal asymmetry in the posterior one third of the left breast located slightly lateral to the plane of the nipple. Further evaluation with spot compression CC mammographic and Tomosynthesis images and possible targeted left breast sonography is recommended.  2. There are no findings suspicious for malignancy in the right breast.   BI-RADS Category 0: Incomplete. Needs additional imaging evaluation.    1/26/2022 diagnostic left mammo with Anadn and left breast limited ultrasound at [facility]  FINDINGS: Digital spot compression CC mammographic and Tomosynthesis  images of the left breast were obtained. Comparison is made to prior  mammography dated 12/27/2021, 12/28/2020 and 12/27/2019. With additional  imaging, there is persistence of the area of focal asymmetry in the  posterior one-third lateral aspect of the left breast. Further  evaluation with targeted left breast sonography is prudent.  ULTRASOUND: Targeted sonographic evaluation of the left breast was  performed through the axillary tail in the 12-o'clock through 5-o'clock  positions. There are scattered cysts seen throughout this region. At the  1-o'clock position on the order of 2 cm from the nipple there is a 1.5  cm benign-appearing cluster of cysts. This represents the largest  visualized cyst.  At the 5-o'clock position on the order of 2 cm from the nipple there is  a 0.8 cm oval circumscribed heterogenous isoechoic and hypoechoic mass  without detectable  internal vascularity. Imaging features suggest an  area of probable benign adenosis.  IMPRESSION:  1. There is a 0.8 cm area of probable benign adenosis in the left breast  at the 5-o'clock position on the order of 2 cm from the nipple.  Six-month sonographic follow-up is recommended.  2. There is a 1.5 cm cluster of cysts seen in the left breast at the  1-o'clock position that corresponds to the mammographically visualized  density. No additional short-term imaging follow-up regarding the cyst  is indicated.  BI-RADS Category 3:    8/1/2022 breast MRI at [facility]  FINDINGS:Heterogenous fibroglandular tissue is seen throughout both  breasts. Moderate background parenchymal enhancement of both breasts is  noted. No areas of architectural distortion are seen in either breast. A  mild degree of scattered stippled enhancement are seen throughout both  breasts.  In the posterior one-third upper outer quadrant of the left breast at  the 3-o'clock position on the order of 7 cm from the nipple, there has  been interval development of discontinuous linear enhancement that  surrounds a cluster of cysts. The linear enhancement measures on the  order of 2 cm in anterior to posterior dimension, 1.2 cm in the superior  to inferior dimension and 1.3 cm in the medial to lateral  dimension.  This corresponds to a region of increased density seen mammographically  on the mammogram of 12/27/2021.  At the 5-o'clock position in the middle third of the left breast on the  order of 3 cm from the nipple, there is linear enhancement that measures  on the order 1.9 cm in anterior to posterior dimension, 0.9 cm in the  superior to inferior dimension and 0.7 cm in the medial to lateral  dimension.  No other areas of abnormal enhancement or morphology are seen in the  left breast. I see no evidence for abnormal left breast skin, nipple or  chest wall enhancement and there is no evidence for left axillary or  internal mammary chain adenopathy.  There  are no areas of abnormal enhancement or morphology in the right  breast. I see no evidence for abnormal right breast skin, nipple or  chest wall enhancement and there is no evidence for right axillary or  internal mammary chain adenopathy.  IMPRESSION:  1. There is discontinuous linear enhancement interposed with a cluster  of cysts in the posterior one-third of the left breast at the 3-o'clock  position. This is in the region of an area of mammographic increased  density seen on the mammogram of 12/27/2021. Correlation with an MR  guided left breast biopsy is recommended.  2. There is linear enhancement seen in the left breast at the 5-o'clock  position on the order of 3 cm from the nipple that measures up to 2 cm  in length. Correlation with an MR guided left breast biopsy is  recommended.  3. There are no findings suspicious for malignancy in the right breast.  BI-RADS category 4    8/2/2022 left Limited ultrasound at [facility]  FINDINGS: Targeted sonographic evaluation of the left breast was  performed through the 5-o'clock position on the order of 2 cm from the  nipple. Comparison is made to prior left breast sonography dated  01/26/2022.  At this location there is a subcutaneous oval circumscribed  heterogenous isoechoic and hypoechoic mass without detectable internal  vascularity. The mass measures 0.7 x 0.5 x 0.5 cm compared to prior  measurements of 0.8 x 0.5 x 0.4 cm.  IMPRESSION:  Stable 0.7 cm area of benign-appearing tissue most  consistent with adenosis and/or fibroadenomatoid change seen in the left  breast at the 5-o'clock position. The sonographic appearance is most  consistent with a benign process. However, when interpreted in  conjunction with the MRI examination performed on 08/01/2022, there was  linear enhancement seen on MRI at the 5-o'clock position on the order of  3 cm from the nipple which may be in close proximity to the  sonographically visualized lesion. No additional action regarding  the  sonographic lesion is indicated and no additional short-term imaging  follow-up of this area is indicated. Further evaluation of the left  breast with an MR guided left breast biopsy is recommended.  BI-RADS Category 4    9/15/2022 MRI breast biopsy, diagnostic left mammogram with CAD at [facility]  PROCEDURE: MRI of the left breast was performed using a dedicated breast  coil and biopsy system. Multiplanar images of the breast were obtained  before and after the administration of intravenous gadolinium.   Site 1: The nonmass enhancement in the posterior left breast was  targeted via a lateral approach. Using dedicated breast MRI-CAD  software, the location and depth of the lesion were calculated. The  overlying skin was prepped in a sterile fashion. 1 mL of 1% lidocaine  and 15 mL of 1% lidocaine with epinephrine were used to anesthetize the  skin and deeper soft tissues. A nonferromagnetic sheath was placed.   Site 2: The nonmass enhancement in the anterior left breast was targeted  via a lateral approach. Using dedicated breast MRI-CAD software, the  location and depth of the lesion were calculated. The overlying skin was  prepped in a sterile fashion. 1 mL of 1% lidocaine and 15 mL of 1%  lidocaine with epinephrine were used to anesthetize the skin and deeper  soft tissues. A nonferromagnetic sheath was placed.   Subsequent imaging demonstrated adequate positioning of the sheaths. An  8 gauge Mammotome vacuum-assisted biopsy device  was then inserted at  site 1 and 10 core samples were obtained. An 8 gauge Mammotome  vacuum-assisted biopsy device  was then inserted at site 2 and 8 core  samples were obtained. Post-biopsy MRI images confirmed adequate  sampling. A stoplight clip was deployed at biopsy site 1. A buckle clip  was deployed at biopsy site 2.   The patient tolerated the procedures well with no immediate  complications.   Post-procedural digital orthogonal mammographic views of the left  breast  demonstrate the biopsy clips at the expected location at the sites of  biopsy.  PATHOLOGY:   Final Diagnosis    1. Left Breast, 3:00, Site A, MRI-Guided Core Needle Biopsy for a Linear  Enhancement:  A. Pseudoangiomatous stromal hyperplasia (PASH), cysts with apocrine  metaplasia, and histologic changes consistent with cyst rupture.  2. Left Breast, 5:00, Site B, MRI-Guided Core Needle Biopsy for a Linear  Enhancement:  A. Pseudoangiomatous stromal hyperplasia (PASH), focal usual ductal  hyperplasia, and columnar cell change.     Electronically signed by Terra Glover MD on 9/16/2022 at 1046    IMPRESSION/RECOMMENDATIONS:  1. MRI guided biopsy of 2 cm nonmass enhancement at 3:00 in the  posterior left breast, marked by a stoplight clip. Pathology is benign  and concordant with the imaging assessment.   2. MRI guided biopsy of 1.9 cm nonmass enhancement at 5:00 in the  anterior left breast, marked by a buckle clip. Pathology is benign and  concordant with the imaging assessment.   3. Recommend annual screening mammogram in December 2022      Pathology:  Pathology from punch biopsy returned as nevus with mild cytologic atypia.     1-16-17  [facility]  PATHOLOGY  JUAN LUIS HANEY  Final Diagnosis   LEFT BREAST BIOPSY (SKIN):  SKIN WITH COMPOUND NEVUS WITH MILD CYTOLOGIC ATYPIA.   MARGIN FREE.       [facility]/a/[facility]  [facility]/juan pablo      MRI guided breast biopsy [facility] January 24, 2019: Right breast 12:00, 8-gauge device, multiple specimens, a post biopsy MRI imaging demonstrates adequate sampling.  Post biopsy mammogram shows clip in the place of the area of concern.  The pathology is concordant with the imaging appearance.  Routine MRI follow-up is recommended.  Pathology returned as focal hyalinized fibrous mastopathy.  Duct ectasia with cyst formation.  No significant intraductal hyperplasia or atypia.    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   Seeing her back in approximately 6 months.     7-28-16                         [facility] MED ONC                           JUAN LUIS CROUCH MD     PLAN:  She is tolerating tamoxifen well with no adverse events or complications. I will plan on seeing her back in approximately 6 months.  Medical oncology note dated August 29, 2017 Dr. Everardo Pappas.  Agree with annual MRI and mammogram.  At this time she would like to not pursue additional prophylactic antiestrogen.  There is no information available at this time demonstrating the efficacy of concerns or aromatase inhibitors with this specific mutation.  With a long-term strategy of risk reduction, I suggested we consider tamoxifen again in the future.  Also, at the time of menopause 12 aromatase inhibitors would be appropriate.  Follow-up with me as needed.  We agree on a routine visit in 1-2 years.    [facility] note dated January 2, 2018 MRI guided breast biopsy: 10-gauge Encore multiple core biopsies.  M-shaped marker placed.  Addendum will be provided when the pathology is available.    Procedures:  Punch biopsy skin lesion 1-12-17:  Indication:  skin lesion of indeterminate significance  Location: LEFT breast UOQ,  inner ring  Consent:  The risks, benefits, and alternatives to the procedure were discussed with the patient, who understood and wished to proceed.  The risks described included, but were not limited to, bleeding and infection.  Description of Procedure:   After the patient was positioned supine on the procedure table, I prepped and draped the affected breast skin in sterile fashion.  I anesthetized the affected skin and subcutaneous tissue with 1% lidocaine with epinephrine.  I then I then took a 8mm  punch biopsy of the affected skin.   Manual compression was held for 5 minutes, 3x 4-0 nylon sutures were placed to approximate the skin edges. Antibacterial ointment  and a sterile dressing were applied.  Tolerance: The patient tolerated the procedure well.  Disposition: We will see her back within a week to discuss her pathology.    Assessment:    1. Benign breast biopsy, right 1/2019, left 1/2018  --RIGHT central- seen on CC only on mammogram , MRI correlate of enhancing focus- BR4- no papable finding  MRI guided breast biopsy [facility] January 24, 2019:-focal hyalinized fibrous mastopathy.  Duct ectasia with cyst formation.  No significant intraductal hyperplasia or atypia. Concordant    --MRI in December 2017 [facility] lower outer quadrant 5 mm area of enhancement, no ultrasound correlate.  MRI guided biopsy showed benign breast with columnar cell change apically and metaplasia and stromal fibrosis.      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  Consider an aromatase inhibitor once postmenopausal.  -Annual mammography and MRI.    5- abnormal imaging left breast, additional imaging recommended    Discussion:  Imaging findings were reviewed.   We previously discussed that the JAMES mutation that she carries harbors a lifetime risk of 17-52%, but likely closer to the 52% end ofthe spectrum due to the exact mutation.  We discussed previously surgical risk reduction strategies as well as pharmacologic risk reduction and enhanced imaging surveillance.  She preferred to proceed with the  latter.  A copy of [facility] assessment was given and reviewed for her risk of breast and other cancers.  We discussed PASH- not cancerous or pre cancerous.  Historically this was surgically excised but now they are no longer removed.  Patient verbalized understanding.    Plan:  I reviewed her interval history, imaging, imaging reports and examination together today.        - screening mammo in Dec 2022  - breast MRI in August 2023, followed by exam    I've asked her to continue her self breast exam monthly and to call us in the interim with concerns and we'd be happy to see her back sooner.    She has been instructed to call Dr. Pappas once she is closer to the postmenopausal period to reconsider aromatase inhibitor as risk reducing intervention.    MILI [place]/transcription disclaimer:  Dictated using Dragon dictation

## 2022-09-27 LAB — CREAT BLDA-MCNC: 0.7 MG/DL (ref 0.6–1.3)

## 2022-12-29 ENCOUNTER — HOSPITAL ENCOUNTER (OUTPATIENT)
Dept: MAMMOGRAPHY | Facility: HOSPITAL | Age: 47
Discharge: HOME | End: 2022-12-29
Admitting: NURSE PRACTITIONER

## 2022-12-29 DIAGNOSIS — Z12.31 VISIT FOR SCREENING MAMMOGRAM: ICD-10-CM

## 2022-12-29 PROCEDURE — 77063 BREAST TOMOSYNTHESIS BI: CPT

## 2022-12-29 PROCEDURE — 77067 SCR MAMMO BI INCL CAD: CPT

## 2023-01-03 ENCOUNTER — TELEPHONE (OUTPATIENT)
Dept: SURGERY | Facility: CLINIC | Age: 48
End: 2023-01-03
Payer: COMMERCIAL

## 2023-01-03 NOTE — TELEPHONE ENCOUNTER
Called and left message with BI-RADS 1 mammogram results.  She has a follow-up appointment with me after her MRI in September

## 2023-09-07 ENCOUNTER — HOSPITAL ENCOUNTER (EMERGENCY)
Facility: HOSPITAL | Age: 48
Discharge: LEFT BEFORE DISCHARGE | End: 2023-09-07
Payer: COMMERCIAL

## 2023-09-07 ENCOUNTER — APPOINTMENT (OUTPATIENT)
Dept: GENERAL RADIOLOGY | Facility: HOSPITAL | Age: 48
End: 2023-09-07
Payer: COMMERCIAL

## 2023-09-07 VITALS
WEIGHT: 155 LBS | RESPIRATION RATE: 16 BRPM | HEART RATE: 77 BPM | TEMPERATURE: 98.1 F | BODY MASS INDEX: 28.52 KG/M2 | DIASTOLIC BLOOD PRESSURE: 75 MMHG | HEIGHT: 62 IN | SYSTOLIC BLOOD PRESSURE: 144 MMHG | OXYGEN SATURATION: 100 %

## 2023-09-07 LAB
ALBUMIN SERPL-MCNC: 4.5 G/DL (ref 3.5–5.2)
ALBUMIN/GLOB SERPL: 1.9 G/DL
ALP SERPL-CCNC: 55 U/L (ref 39–117)
ALT SERPL W P-5'-P-CCNC: 76 U/L (ref 1–33)
ANION GAP SERPL CALCULATED.3IONS-SCNC: 15.4 MMOL/L (ref 5–15)
AST SERPL-CCNC: 122 U/L (ref 1–32)
BASOPHILS # BLD AUTO: 0.01 10*3/MM3 (ref 0–0.2)
BASOPHILS NFR BLD AUTO: 0.1 % (ref 0–1.5)
BILIRUB SERPL-MCNC: 0.8 MG/DL (ref 0–1.2)
BUN SERPL-MCNC: 18 MG/DL (ref 6–20)
BUN/CREAT SERPL: 20.5 (ref 7–25)
CALCIUM SPEC-SCNC: 10 MG/DL (ref 8.6–10.5)
CHLORIDE SERPL-SCNC: 103 MMOL/L (ref 98–107)
CO2 SERPL-SCNC: 23.6 MMOL/L (ref 22–29)
CREAT SERPL-MCNC: 0.88 MG/DL (ref 0.57–1)
DEPRECATED RDW RBC AUTO: 42.2 FL (ref 37–54)
EGFRCR SERPLBLD CKD-EPI 2021: 80.7 ML/MIN/1.73
EOSINOPHIL # BLD AUTO: 0.01 10*3/MM3 (ref 0–0.4)
EOSINOPHIL NFR BLD AUTO: 0.1 % (ref 0.3–6.2)
ERYTHROCYTE [DISTWIDTH] IN BLOOD BY AUTOMATED COUNT: 12.8 % (ref 12.3–15.4)
GLOBULIN UR ELPH-MCNC: 2.4 GM/DL
GLUCOSE SERPL-MCNC: 140 MG/DL (ref 65–99)
HCT VFR BLD AUTO: 39.4 % (ref 34–46.6)
HGB BLD-MCNC: 13.7 G/DL (ref 12–15.9)
HOLD SPECIMEN: NORMAL
HOLD SPECIMEN: NORMAL
IMM GRANULOCYTES # BLD AUTO: 0.01 10*3/MM3 (ref 0–0.05)
IMM GRANULOCYTES NFR BLD AUTO: 0.1 % (ref 0–0.5)
LIPASE SERPL-CCNC: 53 U/L (ref 13–60)
LYMPHOCYTES # BLD AUTO: 0.76 10*3/MM3 (ref 0.7–3.1)
LYMPHOCYTES NFR BLD AUTO: 10.9 % (ref 19.6–45.3)
MAGNESIUM SERPL-MCNC: 1.5 MG/DL (ref 1.6–2.6)
MCH RBC QN AUTO: 31.9 PG (ref 26.6–33)
MCHC RBC AUTO-ENTMCNC: 34.8 G/DL (ref 31.5–35.7)
MCV RBC AUTO: 91.6 FL (ref 79–97)
MONOCYTES # BLD AUTO: 0.2 10*3/MM3 (ref 0.1–0.9)
MONOCYTES NFR BLD AUTO: 2.9 % (ref 5–12)
NEUTROPHILS NFR BLD AUTO: 5.98 10*3/MM3 (ref 1.7–7)
NEUTROPHILS NFR BLD AUTO: 85.9 % (ref 42.7–76)
NRBC BLD AUTO-RTO: 0 /100 WBC (ref 0–0.2)
NT-PROBNP SERPL-MCNC: 58.5 PG/ML (ref 0–450)
PLATELET # BLD AUTO: 259 10*3/MM3 (ref 140–450)
PMV BLD AUTO: 8.6 FL (ref 6–12)
POTASSIUM SERPL-SCNC: 3.6 MMOL/L (ref 3.5–5.2)
PROT SERPL-MCNC: 6.9 G/DL (ref 6–8.5)
QT INTERVAL: 428 MS
QTC INTERVAL: 485 MS
RBC # BLD AUTO: 4.3 10*6/MM3 (ref 3.77–5.28)
SODIUM SERPL-SCNC: 142 MMOL/L (ref 136–145)
TROPONIN T SERPL HS-MCNC: <6 NG/L
WBC NRBC COR # BLD: 6.97 10*3/MM3 (ref 3.4–10.8)
WHOLE BLOOD HOLD COAG: NORMAL
WHOLE BLOOD HOLD SPECIMEN: NORMAL

## 2023-09-07 PROCEDURE — 83690 ASSAY OF LIPASE: CPT

## 2023-09-07 PROCEDURE — 36415 COLL VENOUS BLD VENIPUNCTURE: CPT

## 2023-09-07 PROCEDURE — 93005 ELECTROCARDIOGRAM TRACING: CPT

## 2023-09-07 PROCEDURE — 99211 OFF/OP EST MAY X REQ PHY/QHP: CPT

## 2023-09-07 PROCEDURE — 71045 X-RAY EXAM CHEST 1 VIEW: CPT

## 2023-09-07 PROCEDURE — 85025 COMPLETE CBC W/AUTO DIFF WBC: CPT

## 2023-09-07 PROCEDURE — 83735 ASSAY OF MAGNESIUM: CPT

## 2023-09-07 PROCEDURE — 83880 ASSAY OF NATRIURETIC PEPTIDE: CPT

## 2023-09-07 PROCEDURE — 80053 COMPREHEN METABOLIC PANEL: CPT

## 2023-09-07 PROCEDURE — 84484 ASSAY OF TROPONIN QUANT: CPT

## 2023-09-07 RX ORDER — ASPIRIN 81 MG/1
324 TABLET, CHEWABLE ORAL ONCE
Status: DISCONTINUED | OUTPATIENT
Start: 2023-09-07 | End: 2023-09-07 | Stop reason: HOSPADM

## 2023-09-07 RX ORDER — SODIUM CHLORIDE 0.9 % (FLUSH) 0.9 %
10 SYRINGE (ML) INJECTION AS NEEDED
Status: DISCONTINUED | OUTPATIENT
Start: 2023-09-07 | End: 2023-09-07 | Stop reason: HOSPADM

## 2023-09-07 NOTE — ED TRIAGE NOTES
Pt c/o mid sternal cp radiating into bilateral arms and upper back. Pt states pressure and tight in nature. Pt having n/v r/t cp. Onset of cp 2300

## 2023-09-18 ENCOUNTER — APPOINTMENT (OUTPATIENT)
Dept: MRI IMAGING | Facility: HOSPITAL | Age: 48
End: 2023-09-18

## 2024-10-15 NOTE — TELEPHONE ENCOUNTER
Valium 10 mg po 1 hr pre procedure   Dip #1  No refills    Called into [facility] [phone].   lml   
[phone]

## (undated) DEVICE — COLON KIT: Brand: MEDLINE INDUSTRIES, INC.

## (undated) DEVICE — SOL IRRG H2O PL/BG 1000ML STRL

## (undated) DEVICE — Device: Brand: DEFENDO AIR/WATER/SUCTION AND BIOPSY VALVE